# Patient Record
Sex: FEMALE | Race: BLACK OR AFRICAN AMERICAN | Employment: UNEMPLOYED | URBAN - METROPOLITAN AREA
[De-identification: names, ages, dates, MRNs, and addresses within clinical notes are randomized per-mention and may not be internally consistent; named-entity substitution may affect disease eponyms.]

---

## 2017-03-25 ENCOUNTER — HOSPITAL ENCOUNTER (OUTPATIENT)
Age: 32
Discharge: HOME | End: 2017-03-25
Payer: MEDICARE

## 2017-03-25 LAB
ABSOLUTE EOS #: 0.1 K/UL (ref 0–0.4)
ABSOLUTE LYMPH #: 2.5 K/UL (ref 1–4.8)
ABSOLUTE MONO #: 0.4 K/UL (ref 0.2–0.8)
ALBUMIN SERPL-MCNC: 3.6 G/DL (ref 3.5–5.2)
ALBUMIN/GLOBULIN RATIO: ABNORMAL (ref 1–2.5)
ALP BLD-CCNC: 88 U/L (ref 35–104)
ALT SERPL-CCNC: 12 U/L (ref 5–33)
ANION GAP SERPL CALCULATED.3IONS-SCNC: 12 MMOL/L (ref 9–17)
AST SERPL-CCNC: 11 U/L
BASOPHILS # BLD: 1 % (ref 0–2)
BASOPHILS ABSOLUTE: 0 K/UL (ref 0–0.2)
BILIRUB SERPL-MCNC: 0.4 MG/DL (ref 0.3–1.2)
BUN BLDV-MCNC: 13 MG/DL (ref 6–20)
BUN/CREAT BLD: 13 (ref 9–20)
CALCIUM SERPL-MCNC: 8.9 MG/DL (ref 8.6–10.4)
CHLORIDE BLD-SCNC: 97 MMOL/L (ref 98–107)
CHOLESTEROL/HDL RATIO: 3.3
CHOLESTEROL: 176 MG/DL
CO2: 27 MMOL/L (ref 20–31)
CREAT SERPL-MCNC: 1.03 MG/DL (ref 0.5–0.9)
DIFFERENTIAL TYPE: ABNORMAL
EOSINOPHILS RELATIVE PERCENT: 2 % (ref 1–4)
GFR AFRICAN AMERICAN: >60 ML/MIN
GFR NON-AFRICAN AMERICAN: >60 ML/MIN
GFR SERPL CREATININE-BSD FRML MDRD: ABNORMAL ML/MIN/{1.73_M2}
GFR SERPL CREATININE-BSD FRML MDRD: ABNORMAL ML/MIN/{1.73_M2}
GLUCOSE BLD-MCNC: 90 MG/DL (ref 70–99)
HCT VFR BLD CALC: 36.5 % (ref 36–46)
HDLC SERPL-MCNC: 54 MG/DL
HEMOGLOBIN: 12.2 G/DL (ref 12–16)
LDL CHOLESTEROL: 101 MG/DL (ref 0–130)
LYMPHOCYTES # BLD: 35 % (ref 24–44)
MCH RBC QN AUTO: 30.5 PG (ref 26–34)
MCHC RBC AUTO-ENTMCNC: 33.4 G/DL (ref 31–37)
MCV RBC AUTO: 91.3 FL (ref 80–100)
MONOCYTES # BLD: 6 % (ref 1–7)
PDW BLD-RTO: 17.2 % (ref 11.5–14.5)
PLATELET # BLD: 252 K/UL (ref 130–400)
PLATELET ESTIMATE: ABNORMAL
PMV BLD AUTO: 7.6 FL (ref 6–12)
POTASSIUM SERPL-SCNC: 3.3 MMOL/L (ref 3.7–5.3)
RBC # BLD: 3.99 M/UL (ref 4–5.2)
RBC # BLD: ABNORMAL 10*6/UL
SEG NEUTROPHILS: 56 % (ref 36–66)
SEGMENTED NEUTROPHILS ABSOLUTE COUNT: 3.9 K/UL (ref 1.8–7.7)
SODIUM BLD-SCNC: 136 MMOL/L (ref 135–144)
TOTAL PROTEIN: 7.4 G/DL (ref 6.4–8.3)
TRIGL SERPL-MCNC: 106 MG/DL
TSH SERPL DL<=0.05 MIU/L-ACNC: 0.54 MIU/L (ref 0.3–5)
VITAMIN D 25-HYDROXY: <5 NG/ML (ref 30–100)
VLDLC SERPL CALC-MCNC: NORMAL MG/DL (ref 1–30)
WBC # BLD: 7 K/UL (ref 3.5–11)
WBC # BLD: ABNORMAL 10*3/UL

## 2017-03-25 PROCEDURE — 36415 COLL VENOUS BLD VENIPUNCTURE: CPT

## 2017-03-25 PROCEDURE — 85025 COMPLETE CBC W/AUTO DIFF WBC: CPT

## 2017-03-25 PROCEDURE — 84443 ASSAY THYROID STIM HORMONE: CPT

## 2017-03-25 PROCEDURE — 80053 COMPREHEN METABOLIC PANEL: CPT

## 2017-03-25 PROCEDURE — 80061 LIPID PANEL: CPT

## 2017-03-25 PROCEDURE — 82306 VITAMIN D 25 HYDROXY: CPT

## 2018-08-08 ENCOUNTER — HOSPITAL ENCOUNTER (OUTPATIENT)
Age: 33
Discharge: HOME | End: 2018-08-08
Payer: MEDICARE

## 2018-08-08 LAB
ALBUMIN SERPL-MCNC: 3.3 G/DL (ref 3.5–5.2)
ALBUMIN/GLOBULIN RATIO: ABNORMAL (ref 1–2.5)
ALP BLD-CCNC: 103 U/L (ref 35–104)
ALT SERPL-CCNC: 18 U/L (ref 5–33)
ANION GAP SERPL CALCULATED.3IONS-SCNC: 11 MMOL/L (ref 9–17)
AST SERPL-CCNC: 13 U/L
BILIRUB SERPL-MCNC: 0.38 MG/DL (ref 0.3–1.2)
BUN BLDV-MCNC: 7 MG/DL (ref 6–20)
BUN/CREAT BLD: 10 (ref 9–20)
CALCIUM SERPL-MCNC: 8.8 MG/DL (ref 8.6–10.4)
CHLORIDE BLD-SCNC: 101 MMOL/L (ref 98–107)
CO2: 24 MMOL/L (ref 20–31)
CREAT SERPL-MCNC: 0.69 MG/DL (ref 0.5–0.9)
FERRITIN: 40 UG/L (ref 13–150)
FOLATE: >20 NG/ML
GFR AFRICAN AMERICAN: >60 ML/MIN
GFR NON-AFRICAN AMERICAN: >60 ML/MIN
GFR SERPL CREATININE-BSD FRML MDRD: ABNORMAL ML/MIN/{1.73_M2}
GFR SERPL CREATININE-BSD FRML MDRD: ABNORMAL ML/MIN/{1.73_M2}
GLUCOSE BLD-MCNC: 96 MG/DL (ref 70–99)
HBV SURFACE AB TITR SER: <3.5 MIU/ML
HCT VFR BLD CALC: 37.5 % (ref 36–46)
HEMOGLOBIN: 12.3 G/DL (ref 12–16)
MCH RBC QN AUTO: 33 PG (ref 26–34)
MCHC RBC AUTO-ENTMCNC: 32.8 G/DL (ref 31–37)
MCV RBC AUTO: 100.7 FL (ref 80–100)
NRBC AUTOMATED: ABNORMAL PER 100 WBC
PDW BLD-RTO: 15.4 % (ref 11.5–14.5)
PLATELET # BLD: 211 K/UL (ref 130–400)
PMV BLD AUTO: 7.8 FL (ref 6–12)
POTASSIUM SERPL-SCNC: 3.8 MMOL/L (ref 3.7–5.3)
RBC # BLD: 3.73 M/UL (ref 4–5.2)
SODIUM BLD-SCNC: 136 MMOL/L (ref 135–144)
TOTAL PROTEIN: 6.8 G/DL (ref 6.4–8.3)
VITAMIN B-12: 331 PG/ML (ref 232–1245)
VITAMIN D 25-HYDROXY: 25.8 NG/ML (ref 30–100)
WBC # BLD: 7.6 K/UL (ref 3.5–11)

## 2018-08-08 PROCEDURE — 82607 VITAMIN B-12: CPT

## 2018-08-08 PROCEDURE — 86317 IMMUNOASSAY INFECTIOUS AGENT: CPT

## 2018-08-08 PROCEDURE — 82746 ASSAY OF FOLIC ACID SERUM: CPT

## 2018-08-08 PROCEDURE — 36415 COLL VENOUS BLD VENIPUNCTURE: CPT

## 2018-08-08 PROCEDURE — 82306 VITAMIN D 25 HYDROXY: CPT

## 2018-08-08 PROCEDURE — 85027 COMPLETE CBC AUTOMATED: CPT

## 2018-08-08 PROCEDURE — 80053 COMPREHEN METABOLIC PANEL: CPT

## 2018-08-08 PROCEDURE — 82728 ASSAY OF FERRITIN: CPT

## 2021-04-17 ENCOUNTER — HOSPITAL ENCOUNTER (EMERGENCY)
Age: 36
Discharge: HOME | End: 2021-04-17
Attending: EMERGENCY MEDICINE
Payer: MEDICARE

## 2021-04-17 ENCOUNTER — APPOINTMENT (OUTPATIENT)
Dept: GENERAL RADIOLOGY | Age: 36
End: 2021-04-17
Payer: MEDICARE

## 2021-04-17 VITALS
OXYGEN SATURATION: 100 % | RESPIRATION RATE: 18 BRPM | TEMPERATURE: 97.9 F | WEIGHT: 280 LBS | HEART RATE: 68 BPM | DIASTOLIC BLOOD PRESSURE: 93 MMHG | SYSTOLIC BLOOD PRESSURE: 142 MMHG

## 2021-04-17 DIAGNOSIS — R07.89 OTHER CHEST PAIN: ICD-10-CM

## 2021-04-17 DIAGNOSIS — R07.89 CHEST WALL PAIN: Primary | ICD-10-CM

## 2021-04-17 LAB
ABSOLUTE EOS #: 0.1 K/UL (ref 0–0.4)
ABSOLUTE IMMATURE GRANULOCYTE: ABNORMAL K/UL (ref 0–0.3)
ABSOLUTE LYMPH #: 1.8 K/UL (ref 1–4.8)
ABSOLUTE MONO #: 0.4 K/UL (ref 0.1–1.3)
ALBUMIN SERPL-MCNC: 3.5 G/DL (ref 3.5–5.2)
ALBUMIN/GLOBULIN RATIO: ABNORMAL (ref 1–2.5)
ALP BLD-CCNC: 75 U/L (ref 35–104)
ALT SERPL-CCNC: 8 U/L (ref 5–33)
ANION GAP SERPL CALCULATED.3IONS-SCNC: 7 MMOL/L (ref 9–17)
AST SERPL-CCNC: 9 U/L
BASOPHILS # BLD: 1 % (ref 0–2)
BASOPHILS ABSOLUTE: 0 K/UL (ref 0–0.2)
BILIRUB SERPL-MCNC: 0.55 MG/DL (ref 0.3–1.2)
BUN BLDV-MCNC: 10 MG/DL (ref 6–20)
BUN/CREAT BLD: ABNORMAL (ref 9–20)
CALCIUM SERPL-MCNC: 8.5 MG/DL (ref 8.6–10.4)
CHLORIDE BLD-SCNC: 107 MMOL/L (ref 98–107)
CO2: 24 MMOL/L (ref 20–31)
CREAT SERPL-MCNC: 0.87 MG/DL (ref 0.5–0.9)
DIFFERENTIAL TYPE: ABNORMAL
EOSINOPHILS RELATIVE PERCENT: 2 % (ref 0–4)
GFR AFRICAN AMERICAN: >60 ML/MIN
GFR NON-AFRICAN AMERICAN: >60 ML/MIN
GFR SERPL CREATININE-BSD FRML MDRD: ABNORMAL ML/MIN/{1.73_M2}
GFR SERPL CREATININE-BSD FRML MDRD: ABNORMAL ML/MIN/{1.73_M2}
GLUCOSE BLD-MCNC: 90 MG/DL (ref 70–99)
HCG QUALITATIVE: NEGATIVE
HCT VFR BLD CALC: 33.6 % (ref 36–46)
HEMOGLOBIN: 10.9 G/DL (ref 12–16)
IMMATURE GRANULOCYTES: ABNORMAL %
INR BLD: 1
LYMPHOCYTES # BLD: 38 % (ref 24–44)
MAGNESIUM: 1.7 MG/DL (ref 1.6–2.6)
MCH RBC QN AUTO: 31.9 PG (ref 26–34)
MCHC RBC AUTO-ENTMCNC: 32.5 G/DL (ref 31–37)
MCV RBC AUTO: 98.2 FL (ref 80–100)
MONOCYTES # BLD: 9 % (ref 1–7)
NRBC AUTOMATED: ABNORMAL PER 100 WBC
PDW BLD-RTO: 16.4 % (ref 11.5–14.9)
PLATELET # BLD: 236 K/UL (ref 150–450)
PLATELET ESTIMATE: ABNORMAL
PMV BLD AUTO: 8 FL (ref 6–12)
POTASSIUM SERPL-SCNC: 4.6 MMOL/L (ref 3.7–5.3)
PROTHROMBIN TIME: 13.2 SEC (ref 11.8–14.6)
RBC # BLD: 3.42 M/UL (ref 4–5.2)
RBC # BLD: ABNORMAL 10*6/UL
SEG NEUTROPHILS: 50 % (ref 36–66)
SEGMENTED NEUTROPHILS ABSOLUTE COUNT: 2.4 K/UL (ref 1.3–9.1)
SODIUM BLD-SCNC: 138 MMOL/L (ref 135–144)
TOTAL PROTEIN: 6.7 G/DL (ref 6.4–8.3)
TROPONIN INTERP: NORMAL
TROPONIN INTERP: NORMAL
TROPONIN T: NORMAL NG/ML
TROPONIN T: NORMAL NG/ML
TROPONIN, HIGH SENSITIVITY: <6 NG/L (ref 0–14)
TROPONIN, HIGH SENSITIVITY: <6 NG/L (ref 0–14)
WBC # BLD: 4.7 K/UL (ref 3.5–11)
WBC # BLD: ABNORMAL 10*3/UL

## 2021-04-17 PROCEDURE — 36415 COLL VENOUS BLD VENIPUNCTURE: CPT

## 2021-04-17 PROCEDURE — 93005 ELECTROCARDIOGRAM TRACING: CPT | Performed by: EMERGENCY MEDICINE

## 2021-04-17 PROCEDURE — 85025 COMPLETE CBC W/AUTO DIFF WBC: CPT

## 2021-04-17 PROCEDURE — 80053 COMPREHEN METABOLIC PANEL: CPT

## 2021-04-17 PROCEDURE — 85610 PROTHROMBIN TIME: CPT

## 2021-04-17 PROCEDURE — 6370000000 HC RX 637 (ALT 250 FOR IP): Performed by: EMERGENCY MEDICINE

## 2021-04-17 PROCEDURE — 99284 EMERGENCY DEPT VISIT MOD MDM: CPT

## 2021-04-17 PROCEDURE — 83735 ASSAY OF MAGNESIUM: CPT

## 2021-04-17 PROCEDURE — 84703 CHORIONIC GONADOTROPIN ASSAY: CPT

## 2021-04-17 PROCEDURE — 71045 X-RAY EXAM CHEST 1 VIEW: CPT

## 2021-04-17 PROCEDURE — 84484 ASSAY OF TROPONIN QUANT: CPT

## 2021-04-17 RX ORDER — LIDOCAINE 4 G/G
1 PATCH TOPICAL ONCE
Status: DISCONTINUED | OUTPATIENT
Start: 2021-04-17 | End: 2021-04-17 | Stop reason: HOSPADM

## 2021-04-17 RX ORDER — ACETAMINOPHEN 500 MG
1000 TABLET ORAL EVERY 6 HOURS PRN
Qty: 60 TABLET | Refills: 0 | Status: ON HOLD | OUTPATIENT
Start: 2021-04-17 | End: 2022-06-19 | Stop reason: SDUPTHER

## 2021-04-17 RX ORDER — ACETAMINOPHEN 500 MG
1000 TABLET ORAL ONCE
Status: COMPLETED | OUTPATIENT
Start: 2021-04-17 | End: 2021-04-17

## 2021-04-17 RX ORDER — LIDOCAINE 50 MG/G
1 PATCH TOPICAL DAILY
Qty: 10 PATCH | Refills: 0 | Status: SHIPPED | OUTPATIENT
Start: 2021-04-17

## 2021-04-17 RX ADMIN — ACETAMINOPHEN 1000 MG: 500 TABLET, FILM COATED ORAL at 11:29

## 2021-04-17 ASSESSMENT — PAIN DESCRIPTION - DIRECTION: RADIATING_TOWARDS: L ARM

## 2021-04-17 ASSESSMENT — ENCOUNTER SYMPTOMS
BACK PAIN: 0
NAUSEA: 0
ABDOMINAL PAIN: 0
COUGH: 0
VOMITING: 0
SHORTNESS OF BREATH: 0

## 2021-04-17 ASSESSMENT — HEART SCORE: ECG: 0

## 2021-04-17 ASSESSMENT — PAIN DESCRIPTION - LOCATION: LOCATION: CHEST

## 2021-04-17 ASSESSMENT — PAIN DESCRIPTION - PAIN TYPE: TYPE: ACUTE PAIN

## 2021-04-17 NOTE — ED PROVIDER NOTES
EMERGENCY DEPARTMENT ENCOUNTER    Pt Name: Jamie Cannon  MRN: [id number]  [place] 1985  Date of evaluation: 21  CHIEF COMPLAINT       Chief Complaint   Patient presents with    Chest Pain     Pt states midsternal CP since 9am stating constant and progressively got worse. Pt states L arm/shoulder pain and SOB with CP. EMS gave 1 Nitro PTA pt states it helped with pain. Pt states allergy to ASA. HISTORY OF PRESENT ILLNESS     Chest Pain  Pain location:  R chest  Pain quality: aching    Pain radiates to:  R shoulder  Pain severity:  Moderate  Onset quality:  Gradual  Duration:  2 hours  Timing:  Constant  Progression:  Unchanged  Chronicity:  New  Context: lifting, movement and raising an arm    Relieved by:  Rest  Worsened by: Movement and certain positions  Ineffective treatments:  None tried  Associated symptoms: no abdominal pain, no back pain, no cough, no diaphoresis, no dizziness, no fever, no lower extremity edema, no nausea, no near-syncope, no numbness, no palpitations, no shortness of breath, no syncope, no vomiting and no weakness    was lifting a laundry hamper and carrying it upstairs today  Has three kids  No leg pain  No recent surgery or travel        REVIEW OF SYSTEMS     Review of Systems   Constitutional: Negative for diaphoresis and fever. Respiratory: Negative for cough and shortness of breath. Cardiovascular: Positive for chest pain. Negative for palpitations, syncope and near-syncope. Gastrointestinal: Negative for abdominal pain, nausea and vomiting. Musculoskeletal: Negative for back pain. Neurological: Negative for dizziness, weakness and numbness. All other systems reviewed and are negative. PASTMEDICAL HISTORY     Past Medical History:   Diagnosis Date    Hypertension      Past Problem List  There is no problem list on file for this patient.     SURGICAL HISTORY       Past Surgical History:   Procedure Laterality Date     SECTION      x2 CURRENT MEDICATIONS       Previous Medications    No medications on file     ALLERGIES     is allergic to asa [aspirin]; bee pollen; dust mite extract; flour; potassium-containing compounds; shellfish-derived products; and strawberry extract. FAMILY HISTORY     has no family status information on file. SOCIAL HISTORY       Social History     Tobacco Use    Smoking status: Current Every Day Smoker     Packs/day: 0.25     Types: Cigarettes    Smokeless tobacco: Never Used   Substance Use Topics    Alcohol use: Not Currently    Drug use: Yes     Types: Marijuana     PHYSICAL EXAM     INITIAL VITALS: BP (!) 142/93   Pulse 68   Temp 97.9 °F (36.6 °C) (Oral)   Resp 18   Wt 280 lb (127 kg)   LMP 03/24/2021 (Approximate)   SpO2 100%    Physical Exam  Vitals signs reviewed. Constitutional:       General: She is not in acute distress. Appearance: Normal appearance. She is well-developed. She is not diaphoretic. HENT:      Head: Normocephalic and atraumatic. Right Ear: External ear normal.      Left Ear: External ear normal.      Nose: Nose normal. No congestion. Mouth/Throat:      Mouth: Mucous membranes are moist.      Pharynx: Oropharynx is clear. Eyes:      General:         Right eye: No discharge. Left eye: No discharge. Conjunctiva/sclera: Conjunctivae normal.      Pupils: Pupils are equal, round, and reactive to light. Neck:      Musculoskeletal: Normal range of motion and neck supple. Trachea: No tracheal deviation. Cardiovascular:      Rate and Rhythm: Normal rate and regular rhythm. Pulses: Normal pulses. Heart sounds: Normal heart sounds. Pulmonary:      Effort: Pulmonary effort is normal. No respiratory distress. Breath sounds: Normal breath sounds. No stridor. No wheezing or rales. Comments: Right upper ant reproducible chest wall tenderness  Chest:      Chest wall: Tenderness present. Abdominal:      Palpations: Abdomen is soft. Tenderness: There is no abdominal tenderness. There is no guarding or rebound. Musculoskeletal: Normal range of motion. General: No tenderness or deformity. Skin:     General: Skin is warm and dry. Capillary Refill: Capillary refill takes less than 2 seconds. Findings: No erythema or rash. Neurological:      General: No focal deficit present. Mental Status: She is alert and oriented to person, place, and time. Cranial Nerves: No cranial nerve deficit. Coordination: Coordination normal.   Psychiatric:         Mood and Affect: Mood normal.         Behavior: Behavior normal.         Thought Content: Thought content normal.         Judgment: Judgment normal.         MEDICAL DECISION MAKING:   Do not suspect ACS or AMI or PE or AD  Discussed with patient anticipatory guidance, discharge instructions, follow up PCP 24 hours  rx tylenol and lidoderm  Suspect chest wall pain, muscle strain likely  HEART score 1  PERC Rule Negative  Age < 50 years  Pulse < 100 bpm  SaO2 > 94%  No unilateral leg swelling  No hemoptysis  No recent trauma or surgery  No prior PE or DVT  No hormone use           Procedures    DIAGNOSTIC RESULTS   EKG:All EKG's are interpreted by the Emergency Department Physician who either signs or Co-signs this chart in the absence of a cardiologist.  NSR with SA, some artifact, normal rate and intervals      RADIOLOGY:All plain film, CT, MRI, and formal ultrasound images (except ED bedside ultrasound) are read by the radiologist, see reports below, unless otherwisenoted in MDM or here. XR CHEST PORTABLE   Final Result   No acute cardiopulmonary process. LABS: All lab results were reviewed by myself, and all abnormals are listed below.   Labs Reviewed   CBC WITH AUTO DIFFERENTIAL - Abnormal; Notable for the following components:       Result Value    RBC 3.42 (*)     Hemoglobin 10.9 (*)     Hematocrit 33.6 (*)     RDW 16.4 (*)     Monocytes 9 (*)     All other components within normal limits   COMPREHENSIVE METABOLIC PANEL - Abnormal; Notable for the following components:    Calcium 8.5 (*)     Anion Gap 7 (*)     All other components within normal limits   HCG, SERUM, QUALITATIVE   TROPONIN   PROTIME-INR   MAGNESIUM   TROPONIN       EMERGENCY DEPARTMENTCOURSE:         Vitals:    Vitals:    04/17/21 1048 04/17/21 1100   BP: (!) 153/95 (!) 142/93   Pulse: 66 68   Resp: 18    Temp: 97.9 °F (36.6 °C)    TempSrc: Oral    SpO2: 100% 100%   Weight: 280 lb (127 kg)        The patient was given the following medications while in the emergency department:  Orders Placed This Encounter   Medications    acetaminophen (TYLENOL) tablet 1,000 mg    lidocaine 4 % external patch 1 patch    acetaminophen (TYLENOL) 500 MG tablet     Sig: Take 2 tablets by mouth every 6 hours as needed for Pain     Dispense:  60 tablet     Refill:  0    lidocaine (LIDODERM) 5 %     Sig: Place 1 patch onto the skin daily 12 hours on, 12 hours off. Dispense:  10 patch     Refill:  0     CONSULTS:  None    FINAL IMPRESSION      1. Chest wall pain    2. Other chest pain          DISPOSITION/PLAN   DISPOSITION Discharge - Pending Orders Complete 04/17/2021 01:26:30 PM      PATIENT REFERRED TO:  Aurelia Woodward, APRN - CNP  [address] [facility]  [phone]    Schedule an appointment as soon as possible for a visit in 1 day      DISCHARGE MEDICATIONS:  New Prescriptions    ACETAMINOPHEN (TYLENOL) 500 MG TABLET    Take 2 tablets by mouth every 6 hours as needed for Pain    LIDOCAINE (LIDODERM) 5 %    Place 1 patch onto the skin daily 12 hours on, 12 hours off.      Turner Huber MD  Attending Emergency Physician                    Turner Huber MD  04/17/21 9887

## 2021-04-19 LAB
EKG ATRIAL RATE: 61 BPM
EKG P AXIS: 24 DEGREES
EKG P-R INTERVAL: 174 MS
EKG Q-T INTERVAL: 392 MS
EKG QRS DURATION: 70 MS
EKG QTC CALCULATION (BAZETT): 394 MS
EKG R AXIS: 32 DEGREES
EKG T AXIS: 47 DEGREES
EKG VENTRICULAR RATE: 61 BPM

## 2022-04-18 ENCOUNTER — HOSPITAL ENCOUNTER (EMERGENCY)
Age: 37
Discharge: HOME | End: 2022-04-18
Attending: EMERGENCY MEDICINE
Payer: MEDICARE

## 2022-04-18 VITALS
HEART RATE: 85 BPM | TEMPERATURE: 98.1 F | WEIGHT: 265 LBS | RESPIRATION RATE: 18 BRPM | SYSTOLIC BLOOD PRESSURE: 120 MMHG | BODY MASS INDEX: 42.59 KG/M2 | HEIGHT: 66 IN | OXYGEN SATURATION: 98 % | DIASTOLIC BLOOD PRESSURE: 74 MMHG

## 2022-04-18 DIAGNOSIS — O99.891 ASYMPTOMATIC BACTERIURIA DURING PREGNANCY: Primary | ICD-10-CM

## 2022-04-18 DIAGNOSIS — R82.71 ASYMPTOMATIC BACTERIURIA DURING PREGNANCY: Primary | ICD-10-CM

## 2022-04-18 LAB
-: ABNORMAL
ABSOLUTE EOS #: 0.23 K/UL (ref 0–0.44)
ABSOLUTE IMMATURE GRANULOCYTE: 0.07 K/UL (ref 0–0.3)
ABSOLUTE LYMPH #: 2.57 K/UL (ref 1.1–3.7)
ABSOLUTE MONO #: 0.8 K/UL (ref 0.1–1.2)
ANION GAP SERPL CALCULATED.3IONS-SCNC: 8 MMOL/L (ref 9–17)
BACTERIA: ABNORMAL
BASOPHILS # BLD: 0 % (ref 0–2)
BASOPHILS ABSOLUTE: <0.03 K/UL (ref 0–0.2)
BILIRUBIN URINE: ABNORMAL
BUN BLDV-MCNC: 9 MG/DL (ref 6–20)
CALCIUM SERPL-MCNC: 8.9 MG/DL (ref 8.6–10.4)
CASTS UA: ABNORMAL /LPF (ref 0–2)
CASTS UA: ABNORMAL /LPF (ref 0–2)
CHLORIDE BLD-SCNC: 104 MMOL/L (ref 98–107)
CO2: 24 MMOL/L (ref 20–31)
COLOR: ABNORMAL
CREAT SERPL-MCNC: 0.79 MG/DL (ref 0.5–0.9)
EOSINOPHILS RELATIVE PERCENT: 2 % (ref 1–4)
EPITHELIAL CELLS UA: ABNORMAL /HPF (ref 0–5)
GFR AFRICAN AMERICAN: >60 ML/MIN
GFR NON-AFRICAN AMERICAN: >60 ML/MIN
GFR SERPL CREATININE-BSD FRML MDRD: ABNORMAL ML/MIN/{1.73_M2}
GLUCOSE BLD-MCNC: 100 MG/DL (ref 70–99)
GLUCOSE URINE: NEGATIVE
HCT VFR BLD CALC: 32.6 % (ref 36.3–47.1)
HEMOGLOBIN: 10.6 G/DL (ref 11.9–15.1)
IMMATURE GRANULOCYTES: 1 %
KETONES, URINE: ABNORMAL
LEUKOCYTE ESTERASE, URINE: ABNORMAL
LYMPHOCYTES # BLD: 25 % (ref 24–43)
MCH RBC QN AUTO: 31.6 PG (ref 25.2–33.5)
MCHC RBC AUTO-ENTMCNC: 32.5 G/DL (ref 28.4–34.8)
MCV RBC AUTO: 97.3 FL (ref 82.6–102.9)
MONOCYTES # BLD: 8 % (ref 3–12)
MUCUS: ABNORMAL
NITRITE, URINE: NEGATIVE
NRBC AUTOMATED: 0 PER 100 WBC
PDW BLD-RTO: 13.7 % (ref 11.8–14.4)
PH UA: 5 (ref 5–8)
PLATELET # BLD: 287 K/UL (ref 138–453)
PMV BLD AUTO: 9.6 FL (ref 8.1–13.5)
POTASSIUM SERPL-SCNC: 4.2 MMOL/L (ref 3.7–5.3)
PROTEIN UA: ABNORMAL
RBC # BLD: 3.35 M/UL (ref 3.95–5.11)
RBC UA: ABNORMAL /HPF (ref 0–2)
SEG NEUTROPHILS: 64 % (ref 36–65)
SEGMENTED NEUTROPHILS ABSOLUTE COUNT: 6.77 K/UL (ref 1.5–8.1)
SODIUM BLD-SCNC: 136 MMOL/L (ref 135–144)
SPECIFIC GRAVITY UA: 1.03 (ref 1–1.03)
TROPONIN, HIGH SENSITIVITY: <6 NG/L (ref 0–14)
TURBIDITY: ABNORMAL
URINE HGB: ABNORMAL
UROBILINOGEN, URINE: NORMAL
WBC # BLD: 10.5 K/UL (ref 3.5–11.3)
WBC UA: ABNORMAL /HPF (ref 0–5)

## 2022-04-18 PROCEDURE — 93005 ELECTROCARDIOGRAM TRACING: CPT | Performed by: STUDENT IN AN ORGANIZED HEALTH CARE EDUCATION/TRAINING PROGRAM

## 2022-04-18 PROCEDURE — 80048 BASIC METABOLIC PNL TOTAL CA: CPT

## 2022-04-18 PROCEDURE — 85025 COMPLETE CBC W/AUTO DIFF WBC: CPT

## 2022-04-18 PROCEDURE — 84484 ASSAY OF TROPONIN QUANT: CPT

## 2022-04-18 PROCEDURE — 81001 URINALYSIS AUTO W/SCOPE: CPT

## 2022-04-18 PROCEDURE — 99284 EMERGENCY DEPT VISIT MOD MDM: CPT

## 2022-04-18 PROCEDURE — 87086 URINE CULTURE/COLONY COUNT: CPT

## 2022-04-18 RX ORDER — CEPHALEXIN 500 MG/1
500 CAPSULE ORAL 2 TIMES DAILY
Qty: 10 CAPSULE | Refills: 0 | Status: SHIPPED | OUTPATIENT
Start: 2022-04-18 | End: 2022-04-23

## 2022-04-18 ASSESSMENT — ENCOUNTER SYMPTOMS
SINUS PRESSURE: 0
SINUS PAIN: 0
ABDOMINAL DISTENTION: 0
SHORTNESS OF BREATH: 0
DIARRHEA: 0
EYE PAIN: 0
COUGH: 0
EYE ITCHING: 0
ABDOMINAL PAIN: 0
SORE THROAT: 0
CONSTIPATION: 0
NAUSEA: 0

## 2022-04-18 ASSESSMENT — PAIN SCALES - GENERAL: PAINLEVEL_OUTOF10: 6

## 2022-04-19 LAB
CULTURE: NORMAL
EKG ATRIAL RATE: 76 BPM
EKG P AXIS: 12 DEGREES
EKG P-R INTERVAL: 146 MS
EKG Q-T INTERVAL: 346 MS
EKG QRS DURATION: 62 MS
EKG QTC CALCULATION (BAZETT): 389 MS
EKG R AXIS: 50 DEGREES
EKG T AXIS: 49 DEGREES
EKG VENTRICULAR RATE: 76 BPM
SPECIMEN DESCRIPTION: NORMAL

## 2022-04-19 PROCEDURE — 93010 ELECTROCARDIOGRAM REPORT: CPT | Performed by: INTERNAL MEDICINE

## 2022-04-19 NOTE — ED PROVIDER NOTES
[facility]     Emergency Department     Faculty Note/ Attestation      Pt Name: Joey Ritchie                                       MRN: [id number]  [place] 1985  Date of evaluation: 4/18/2022    Patients PCP:    GROVER Thomas - CNP    Attestation  I performed a history and physical examination of the patient and discussed management with the resident. I reviewed the residents note and agree with the documented findings and plan of care. Any areas of disagreement are noted on the chart. I was personally present for the key portions of any procedures. I have documented in the chart those procedures where I was not present during the key portions. I have reviewed the emergency nurses triage note. I agree with the chief complaint, past medical history, past surgical history, allergies, medications, social and family history as documented unless otherwise noted below. For Physician Assistant/ Nurse Practitioner cases/documentation I have personally evaluated this patient and have completed at least one if not all key elements of the E/M (history, physical exam, and MDM). Additional findings are as noted. Initial Screens:        [place] Coma Scale  Eye Opening: Spontaneous  Best Verbal Response: Oriented  Best Motor Response: Obeys commands  [place] Coma Scale Score: 15    Vitals:    Vitals:    04/18/22 2055   BP: 124/75   Pulse: 85   Resp: 18   Temp: 98.1 °F (36.7 °C)   SpO2: 100%   Weight: 265 lb (120.2 kg)   Height: 5' 6\" (1.676 m)       CHIEF COMPLAINT     No chief complaint on file. Patient is a 68-year-old female high risk pregnancy who has not been feeling well.           EMERGENCY DEPARTMENT COURSE:     -------------------------  BP: 124/75, Temp: 98.1 °F (36.7 °C), Pulse: 85, Resp: 18  By the time my examination patient's pain is improved    Comments  Labs showed no signs of kidney damage no signs of electrolyte disturbance no troponin elevation with symptoms over several hours will discuss with OB    ED Course as of 04/18/22 2238 Mon Apr 18, 2022 2130 EKG Interpretation   Interpreted by Jere Barajas DO    Rhythm: normal sinus   Rate: normal  Axis: normal  Ectopy: none  Conduction: normal  ST Segments: normal  T Waves: normal  Q Waves: none    Clinical Impression: no acute changes normal EKG     [WK]   2222 Leukocyte Esterase, Urine(!): MODERATE [LR]   2222 Troponin, High Sensitivity: <6 [LR]      ED Course User Index  [LR] Odessa Beavers DO  [WK] DO Jere Souza DO,, DO, RDMS.   Attending Emergency Physician          Jere Barajas DO  04/18/22 2239

## 2022-04-19 NOTE — ED NOTES
Pt presents to the ED by EMS with c/o dizziness and lt sided abd pain. Pt states that she is currently 18-19 weeks pregnant with her third child, and that she has a history of preeclampsia. VSS. No distress noted. Call light within reach.       Catie Mary RN  04/18/22 7589

## 2022-04-19 NOTE — ED PROVIDER NOTES
[facility]  ED  Emergency Department Encounter  EmergencyMedicine Resident     Pt Suad Smith  MRN: [id number]  [place] 1985  Date of evaluation: 4/18/22  PCP:  GROVER Vazquez CNP    This patient was evaluated in the Emergency Department for symptoms described in the history of present illness. The patient was evaluated in the context of the global COVID-19 pandemic, which necessitated consideration that the patient might be at risk for infection with the SARS-CoV-2 virus that causes COVID-19. Institutional protocols and algorithms that pertain to the evaluation of patients at risk for COVID-19 are in a state of rapid change based on information released by regulatory bodies including the CDC and federal and state organizations. These policies and algorithms were followed during the patient's care in the ED. CHIEF COMPLAINT       No chief complaint on file. HISTORY OF PRESENT ILLNESS  (Location/Symptom, Timing/Onset, Context/Setting, Quality, Duration, Modifying Factors, Severity.)      Cindi Celestin is a 42 y.o. female who presents with episode of dizziness and lightheadedness that was 20 minutes in duration. She denies any chest pain or associated shortness of breath. Symptoms are currently resolved. Reports that she gets episodes like this frequently but they are shorter in duration and usually associated with head movement. This 1 episode was not resolving so she called EMS. She is currently 19 weeks pregnant, denies any vaginal bleeding, loss of fluid, contractions or abdominal pain. She reports a history of preeclampsia and her last pregnancy ended in a 3-month premature delivery. This pregnancy so far has been uncomplicated. Medical history otherwise includes heart murmur which she states has required no intervention. PAST MEDICAL / SURGICAL / SOCIAL / FAMILY HISTORY      has a past medical history of Hypertension.        has a past surgical history that includes  section. Social History     Socioeconomic History    Marital status: Single     Spouse name: Not on file    Number of children: Not on file    Years of education: Not on file    Highest education level: Not on file   Occupational History    Not on file   Tobacco Use    Smoking status: Current Every Day Smoker     Packs/day: 0.25     Types: Cigarettes    Smokeless tobacco: Never Used   Substance and Sexual Activity    Alcohol use: Not Currently    Drug use: Yes     Types: Marijuana Gely Dasen)    Sexual activity: Not on file   Other Topics Concern    Not on file   Social History Narrative    Not on file     Social Determinants of Health     Financial Resource Strain:     Difficulty of Paying Living Expenses: Not on file   Food Insecurity:     Worried About Running Out of Food in the Last Year: Not on file    Justina of Food in the Last Year: Not on file   Transportation Needs:     Lack of Transportation (Medical): Not on file    Lack of Transportation (Non-Medical):  Not on file   Physical Activity:     Days of Exercise per Week: Not on file    Minutes of Exercise per Session: Not on file   Stress:     Feeling of Stress : Not on file   Social Connections:     Frequency of Communication with Friends and Family: Not on file    Frequency of Social Gatherings with Friends and Family: Not on file    Attends Catholic Services: Not on file    Active Member of [address] [place] [address] [email or web address] or Organizations: Not on file    Attends Club or Organization Meetings: Not on file    Marital Status: Not on file   Intimate Partner Violence:     Fear of Current or Ex-Partner: Not on file    Emotionally Abused: Not on file    Physically Abused: Not on file    Sexually Abused: Not on file   Housing Stability:     Unable to Pay for Housing in the Last Year: Not on file    Number of [place] in the Last Year: Not on file    Unstable Housing in the Last Year: Not on file       No family history on file.    Allergies:  Asa [aspirin], Bee pollen, Dust mite extract, Flour, Potassium-containing compounds, Shellfish-derived products, and Strawberry extract    Home Medications:  Prior to Admission medications    Medication Sig Start Date End Date Taking? Authorizing Provider   cephALEXin (KEFLEX) 500 MG capsule Take 1 capsule by mouth 2 times daily for 5 days 4/18/22 4/23/22 Yes Odessa Beavers,    acetaminophen (TYLENOL) 500 MG tablet Take 2 tablets by mouth every 6 hours as needed for Pain 4/17/21   Georga Dakins, MD   lidocaine (LIDODERM) 5 % Place 1 patch onto the skin daily 12 hours on, 12 hours off. 4/17/21   Georga Dakins, MD       REVIEW OF SYSTEMS    (2-9 systems for level 4, 10 or more for level 5)      Review of Systems   Constitutional: Negative for activity change, chills and fever. HENT: Negative for congestion, sinus pressure, sinus pain and sore throat. Eyes: Negative for pain and itching. Respiratory: Negative for cough and shortness of breath. Cardiovascular: Negative for chest pain. Gastrointestinal: Negative for abdominal distention, abdominal pain, constipation, diarrhea and nausea. Endocrine: Negative for polyuria. Genitourinary: Negative for dysuria and frequency. Musculoskeletal: Negative for arthralgias. Skin: Negative for rash. Neurological: Positive for light-headedness. Negative for headaches. PHYSICAL EXAM   (up to 7 for level 4, 8 or more for level 5)      INITIAL VITALS:   /74   Pulse 85   Temp 98.1 °F (36.7 °C)   Resp 18   Ht 5' 6\" (1.676 m)   Wt 265 lb (120.2 kg)   SpO2 98%   BMI 42.77 kg/m²     Physical Exam  Vitals reviewed. Constitutional:       General: She is not in acute distress. HENT:      Head: Normocephalic and atraumatic. Ears:      Comments: Hearing grossly normal     Nose: Nose normal.      Mouth/Throat:      Mouth: Mucous membranes are moist.      Pharynx: Oropharynx is clear.    Eyes:      General: No scleral icterus. Conjunctiva/sclera: Conjunctivae normal.      Pupils: Pupils are equal, round, and reactive to light. Cardiovascular:      Rate and Rhythm: Normal rate and regular rhythm. Pulses: Normal pulses. Pulmonary:      Effort: Pulmonary effort is normal. No respiratory distress. Breath sounds: Normal breath sounds. Abdominal:      General: There is no distension. Palpations: Abdomen is soft. Tenderness: There is no abdominal tenderness. There is no guarding. Comments: Bedside US shows fetal heart tones 154   Musculoskeletal:      Cervical back: No muscular tenderness. Right lower leg: No edema. Left lower leg: No edema. Skin:     General: Skin is warm and dry. Capillary Refill: Capillary refill takes less than 2 seconds. Neurological:      General: No focal deficit present. Mental Status: She is alert and oriented to person, place, and time. Mental status is at baseline. Comments: 5/5 strength throughout  Symmetrical eyebrow raise and smile.          DIFFERENTIAL  DIAGNOSIS     PLAN (LABS / IMAGING / EKG):  Orders Placed This Encounter   Procedures    Culture, Urine    Basic Metabolic Panel    CBC with Auto Differential    Troponin    Urinalysis with Reflex to Culture    Microscopic Urinalysis    EKG 12 Lead    Insert peripheral IV       MEDICATIONS ORDERED:  Orders Placed This Encounter   Medications    cephALEXin (KEFLEX) 500 MG capsule     Sig: Take 1 capsule by mouth 2 times daily for 5 days     Dispense:  10 capsule     Refill:  0       DIAGNOSTIC RESULTS / EMERGENCY DEPARTMENT COURSE / MDM   LAB RESULTS:  Results for orders placed or performed during the hospital encounter of 83/72/58   Basic Metabolic Panel   Result Value Ref Range    Glucose 100 (H) 70 - 99 mg/dL    BUN 9 6 - 20 mg/dL    CREATININE 0.79 0.50 - 0.90 mg/dL    Calcium 8.9 8.6 - 10.4 mg/dL    Sodium 136 135 - 144 mmol/L    Potassium 4.2 3.7 - 5.3 mmol/L    Chloride 104 98 - 107 mmol/L    CO2 24 20 - 31 mmol/L    Anion Gap 8 (L) 9 - 17 mmol/L    GFR Non-African American >60 >60 mL/min    GFR African American >60 >60 mL/min    GFR Comment         CBC with Auto Differential   Result Value Ref Range    WBC 10.5 3.5 - 11.3 k/uL    RBC 3.35 (L) 3.95 - 5.11 m/uL    Hemoglobin 10.6 (L) 11.9 - 15.1 g/dL    Hematocrit 32.6 (L) 36.3 - 47.1 %    MCV 97.3 82.6 - 102.9 fL    MCH 31.6 25.2 - 33.5 pg    MCHC 32.5 28.4 - 34.8 g/dL    RDW 13.7 11.8 - 14.4 %    Platelets 872 131 - 361 k/uL    MPV 9.6 8.1 - 13.5 fL    NRBC Automated 0.0 0.0 per 100 WBC    Seg Neutrophils 64 36 - 65 %    Lymphocytes 25 24 - 43 %    Monocytes 8 3 - 12 %    Eosinophils % 2 1 - 4 %    Basophils 0 0 - 2 %    Immature Granulocytes 1 (H) 0 %    Segs Absolute 6.77 1.50 - 8.10 k/uL    Absolute Lymph # 2.57 1.10 - 3.70 k/uL    Absolute Mono # 0.80 0.10 - 1.20 k/uL    Absolute Eos # 0.23 0.00 - 0.44 k/uL    Basophils Absolute <0.03 0.00 - 0.20 k/uL    Absolute Immature Granulocyte 0.07 0.00 - 0.30 k/uL   Troponin   Result Value Ref Range    Troponin, High Sensitivity <6 0 - 14 ng/L   Urinalysis with Reflex to Culture    Specimen: Urine   Result Value Ref Range    Color, UA Dark Yellow (A) Yellow    Turbidity UA Cloudy (A) Clear    Glucose, Ur NEGATIVE NEGATIVE    Bilirubin Urine NEGATIVE  Verified by ictotest. (A) NEGATIVE    Ketones, Urine TRACE (A) NEGATIVE    Specific Gravity, UA 1.029 1.005 - 1.030    Urine Hgb TRACE (A) NEGATIVE    pH, UA 5.0 5.0 - 8.0    Protein, UA 1+ (A) NEGATIVE    Urobilinogen, Urine Normal Normal    Nitrite, Urine NEGATIVE NEGATIVE    Leukocyte Esterase, Urine MODERATE (A) NEGATIVE   Microscopic Urinalysis   Result Value Ref Range    -          WBC, UA TOO NUMEROUS TO COUNT 0 - 5 /HPF    RBC, UA 10 TO 20 0 - 2 /HPF    Casts UA 10 TO 20 0 - 2 /LPF    Casts UA HYALINE 0 - 2 /LPF    Epithelial Cells UA 5 TO 10 0 - 5 /HPF    Bacteria, UA MODERATE (A) None    Mucus, UA 2+ (A) None       IMPRESSION: Albaro Lara Terence Rowland is a 42 y.o. woman presenting with lightheadedness and currently 19 weeks pregnant with a high risk pregnancy and history of preeclampsia. Pressures are normal on presentation. Symptoms are fully resolved at this time. She does have a history of lightheaded episodes which have previously been associated with head movements, possibly related to BPPV. Her neuro exam is nonfocal.  She reports that she is well-hydrated. Will obtain cardiac work-up and urinalysis and assess fetal heart tones with bedside ultrasound. If work-up is negative will contact OB to inform prior to discharge due to high risk pregnancy. RADIOLOGY:  No results found. EKG  Normal rate, sinus, normal axis, normal intervals, no ST segment elevations or depressions    All EKG's are interpreted by the Emergency Department Physician who either signs or Co-signs this chart in the absence of a cardiologist.    EMERGENCY DEPARTMENT COURSE:  Patient seen and evaluated, VSS and nontoxic in appearance. ED work-up demonstrates undetectable troponin, unremarkable blood counts and kidney function and electrolytes. EKG was nonconcerning. Bedside ultrasound shows fetal heart tones of 154. Urine does show signs of infection, patient is asymptomatic. Will treat asymptomatic bacteriuria in pregnancy. Advised patient to follow-up with OB.  OB/GYN was contacted and did not advise any further work-up for high risk pregnancy. Patient was recommended for discharge. Patient understands to return to the emergency department for any new or worsening symptoms and to see their PCP regarding hospital follow up.    ED Course as of 04/19/22 0150   Mon Apr 18, 2022 2130 EKG Interpretation   Interpreted by Rica Keyes DO    Rhythm: normal sinus   Rate: normal  Axis: normal  Ectopy: none  Conduction: normal  ST Segments: normal  T Waves: normal  Q Waves: none    Clinical Impression: no acute changes normal EKG     [WK]   2222 Leukocyte Esterase, Urine(!): MODERATE [LR]   2222 Troponin, High Sensitivity: <6 [LR]      ED Course User Index  [LR] Odessa Beavers DO  [WK] Yoon Ulloa DO      PROCEDURES:  none    CONSULTS:  None    CRITICAL CARE:  See attending note    FINAL IMPRESSION      1.  Asymptomatic bacteriuria during pregnancy          DISPOSITION / PLAN     DISPOSITION  Discharged 04/19/22 1:51 AM     PATIENT REFERRED TO:  Wyatt Gonzalez, APRN - CNP  [address] [place] [facility]  [phone]      As needed, If symptoms worsen    [facility]  [address] [place] [address]  [phone]    As needed, If symptoms worsen      DISCHARGE MEDICATIONS:  Discharge Medication List as of 4/18/2022 10:52 PM      START taking these medications    Details   cephALEXin (KEFLEX) 500 MG capsule Take 1 capsule by mouth 2 times daily for 5 days, Disp-10 capsule, R-0Print             Benigno Smith DO  Emergency Medicine Resident    (Please note that portions of thisnote were completed with a voice recognition program.  Efforts were made to edit the dictations but occasionally words are mis-transcribed.)       Benigno Smith DO  Resident  04/19/22 0151

## 2022-04-19 NOTE — ED NOTES
Pt provided ice chips and a warm blanket. Lights dimmed for comfort.      Gerson Johnson, CHASTITY  04/18/22 5365

## 2022-06-18 ENCOUNTER — ANESTHESIA EVENT (OUTPATIENT)
Dept: LABOR AND DELIVERY | Age: 37
DRG: 540 | End: 2022-06-18
Payer: MEDICARE

## 2022-06-18 ENCOUNTER — APPOINTMENT (OUTPATIENT)
Dept: CT IMAGING | Age: 37
DRG: 540 | End: 2022-06-18
Payer: MEDICARE

## 2022-06-18 ENCOUNTER — HOSPITAL ENCOUNTER (INPATIENT)
Age: 37
LOS: 4 days | Discharge: HOME | DRG: 540 | End: 2022-06-22
Attending: OBSTETRICS & GYNECOLOGY | Admitting: OBSTETRICS & GYNECOLOGY
Payer: MEDICARE

## 2022-06-18 ENCOUNTER — ANESTHESIA (OUTPATIENT)
Dept: LABOR AND DELIVERY | Age: 37
DRG: 540 | End: 2022-06-18
Payer: MEDICARE

## 2022-06-18 DIAGNOSIS — E04.1 THYROID NODULE: ICD-10-CM

## 2022-06-18 DIAGNOSIS — Z98.890 POSTOPERATIVE STATE: Primary | ICD-10-CM

## 2022-06-18 PROBLEM — Z98.891 HISTORY OF C-SECTION: Status: ACTIVE | Noted: 2022-06-18

## 2022-06-18 PROBLEM — Z87.59 HISTORY OF PRE-ECLAMPSIA: Status: ACTIVE | Noted: 2022-06-18

## 2022-06-18 PROBLEM — O99.012 ANEMIA AFFECTING PREGNANCY IN SECOND TRIMESTER: Status: ACTIVE | Noted: 2022-06-18

## 2022-06-18 PROBLEM — Z87.898 HISTORY OF POOR FETAL GROWTH: Status: ACTIVE | Noted: 2022-06-18

## 2022-06-18 PROBLEM — O09.30 LATE PRENATAL CARE: Status: ACTIVE | Noted: 2022-06-18

## 2022-06-18 PROBLEM — O10.919 CHRONIC HYPERTENSION AFFECTING PREGNANCY: Status: ACTIVE | Noted: 2022-06-18

## 2022-06-18 PROBLEM — Z72.0 TOBACCO USE: Status: ACTIVE | Noted: 2022-06-18

## 2022-06-18 PROBLEM — O09.529 AMA (ADVANCED MATERNAL AGE) MULTIGRAVIDA 35+: Status: ACTIVE | Noted: 2022-06-18

## 2022-06-18 PROBLEM — I10 CHRONIC HYPERTENSION: Status: ACTIVE | Noted: 2022-06-18

## 2022-06-18 PROBLEM — F12.10 TETRAHYDROCANNABINOL (THC) USE DISORDER, MILD, ABUSE: Status: ACTIVE | Noted: 2022-06-18

## 2022-06-18 PROBLEM — B97.7 HPV IN FEMALE: Status: ACTIVE | Noted: 2022-06-18

## 2022-06-18 PROBLEM — Z87.51 HISTORY OF PRETERM DELIVERY: Status: ACTIVE | Noted: 2022-06-18

## 2022-06-18 LAB
ABO/RH: NORMAL
ABSOLUTE EOS #: 0.09 K/UL (ref 0–0.44)
ABSOLUTE EOS #: <0.03 K/UL (ref 0–0.44)
ABSOLUTE IMMATURE GRANULOCYTE: 0.16 K/UL (ref 0–0.3)
ABSOLUTE IMMATURE GRANULOCYTE: <0.03 K/UL (ref 0–0.3)
ABSOLUTE LYMPH #: 1.02 K/UL (ref 1.1–3.7)
ABSOLUTE LYMPH #: 2.2 K/UL (ref 1.1–3.7)
ABSOLUTE MONO #: 0.31 K/UL (ref 0.1–1.2)
ABSOLUTE MONO #: 0.53 K/UL (ref 0.1–1.2)
ALBUMIN SERPL-MCNC: 3.1 G/DL (ref 3.5–5.2)
ALBUMIN SERPL-MCNC: 3.5 G/DL (ref 3.5–5.2)
ALBUMIN/GLOBULIN RATIO: 0.8 (ref 1–2.5)
ALBUMIN/GLOBULIN RATIO: 0.9 (ref 1–2.5)
ALP BLD-CCNC: 113 U/L (ref 35–104)
ALP BLD-CCNC: 117 U/L (ref 35–104)
ALT SERPL-CCNC: 43 U/L (ref 5–33)
ALT SERPL-CCNC: 46 U/L (ref 5–33)
AMPHETAMINE SCREEN URINE: NEGATIVE
ANION GAP SERPL CALCULATED.3IONS-SCNC: 13 MMOL/L (ref 9–17)
ANION GAP SERPL CALCULATED.3IONS-SCNC: 16 MMOL/L (ref 9–17)
ANTIBODY SCREEN: NEGATIVE
ARM BAND NUMBER: NORMAL
AST SERPL-CCNC: 29 U/L
AST SERPL-CCNC: 35 U/L
BARBITURATE SCREEN URINE: NEGATIVE
BASOPHILS # BLD: 0 % (ref 0–2)
BASOPHILS # BLD: 0 % (ref 0–2)
BASOPHILS ABSOLUTE: 0.03 K/UL (ref 0–0.2)
BASOPHILS ABSOLUTE: <0.03 K/UL (ref 0–0.2)
BENZODIAZEPINE SCREEN, URINE: NEGATIVE
BILIRUB SERPL-MCNC: 0.78 MG/DL (ref 0.3–1.2)
BILIRUB SERPL-MCNC: 0.89 MG/DL (ref 0.3–1.2)
BUN BLDV-MCNC: 11 MG/DL (ref 6–20)
BUN BLDV-MCNC: 11 MG/DL (ref 6–20)
CALCIUM SERPL-MCNC: 8.6 MG/DL (ref 8.6–10.4)
CALCIUM SERPL-MCNC: 8.9 MG/DL (ref 8.6–10.4)
CANNABINOID SCREEN URINE: POSITIVE
CHLORIDE BLD-SCNC: 102 MMOL/L (ref 98–107)
CHLORIDE BLD-SCNC: 102 MMOL/L (ref 98–107)
CO2: 15 MMOL/L (ref 20–31)
CO2: 20 MMOL/L (ref 20–31)
COCAINE METABOLITE, URINE: NEGATIVE
CREAT SERPL-MCNC: 1 MG/DL (ref 0.5–0.9)
CREAT SERPL-MCNC: 1.15 MG/DL (ref 0.5–0.9)
CREATININE URINE: 218.5 MG/DL (ref 28–217)
EOSINOPHILS RELATIVE PERCENT: 0 % (ref 1–4)
EOSINOPHILS RELATIVE PERCENT: 1 % (ref 1–4)
EXPIRATION DATE: NORMAL
GFR AFRICAN AMERICAN: >60 ML/MIN
GFR AFRICAN AMERICAN: >60 ML/MIN
GFR NON-AFRICAN AMERICAN: 53 ML/MIN
GFR NON-AFRICAN AMERICAN: >60 ML/MIN
GFR SERPL CREATININE-BSD FRML MDRD: ABNORMAL ML/MIN/{1.73_M2}
GFR SERPL CREATININE-BSD FRML MDRD: ABNORMAL ML/MIN/{1.73_M2}
GLUCOSE BLD-MCNC: 74 MG/DL (ref 70–99)
GLUCOSE BLD-MCNC: 84 MG/DL (ref 70–99)
HCT VFR BLD CALC: 36.5 % (ref 36.3–47.1)
HCT VFR BLD CALC: 38.2 % (ref 36.3–47.1)
HEMOGLOBIN: 12.1 G/DL (ref 11.9–15.1)
HEMOGLOBIN: 12.4 G/DL (ref 11.9–15.1)
IMMATURE GRANULOCYTES: 0 %
IMMATURE GRANULOCYTES: 2 %
LV EF: 58 %
LVEF MODALITY: NORMAL
LYMPHOCYTES # BLD: 10 % (ref 24–43)
LYMPHOCYTES # BLD: 35 % (ref 24–43)
MAGNESIUM: 2.8 MG/DL (ref 1.6–2.6)
MCH RBC QN AUTO: 31.8 PG (ref 25.2–33.5)
MCH RBC QN AUTO: 32 PG (ref 25.2–33.5)
MCHC RBC AUTO-ENTMCNC: 32.5 G/DL (ref 28.4–34.8)
MCHC RBC AUTO-ENTMCNC: 33.2 G/DL (ref 28.4–34.8)
MCV RBC AUTO: 96.1 FL (ref 82.6–102.9)
MCV RBC AUTO: 98.5 FL (ref 82.6–102.9)
METHADONE SCREEN, URINE: NEGATIVE
MONOCYTES # BLD: 3 % (ref 3–12)
MONOCYTES # BLD: 8 % (ref 3–12)
NRBC AUTOMATED: 0 PER 100 WBC
NRBC AUTOMATED: 0 PER 100 WBC
OPIATES, URINE: NEGATIVE
OXYCODONE SCREEN URINE: NEGATIVE
PDW BLD-RTO: 14.6 % (ref 11.8–14.4)
PDW BLD-RTO: 14.8 % (ref 11.8–14.4)
PHENCYCLIDINE, URINE: NEGATIVE
PLATELET # BLD: 250 K/UL (ref 138–453)
PLATELET # BLD: 268 K/UL (ref 138–453)
PMV BLD AUTO: 10.3 FL (ref 8.1–13.5)
PMV BLD AUTO: 10.3 FL (ref 8.1–13.5)
POTASSIUM SERPL-SCNC: 3.7 MMOL/L (ref 3.7–5.3)
POTASSIUM SERPL-SCNC: 3.8 MMOL/L (ref 3.7–5.3)
PRO-BNP: 1578 PG/ML
RBC # BLD: 3.8 M/UL (ref 3.95–5.11)
RBC # BLD: 3.88 M/UL (ref 3.95–5.11)
RBC # BLD: ABNORMAL 10*6/UL
RBC # BLD: ABNORMAL 10*6/UL
SARS-COV-2, RAPID: NOT DETECTED
SEG NEUTROPHILS: 56 % (ref 36–65)
SEG NEUTROPHILS: 85 % (ref 36–65)
SEGMENTED NEUTROPHILS ABSOLUTE COUNT: 3.51 K/UL (ref 1.5–8.1)
SEGMENTED NEUTROPHILS ABSOLUTE COUNT: 8.76 K/UL (ref 1.5–8.1)
SODIUM BLD-SCNC: 133 MMOL/L (ref 135–144)
SODIUM BLD-SCNC: 135 MMOL/L (ref 135–144)
SPECIMEN DESCRIPTION: NORMAL
T. PALLIDUM, IGG: NONREACTIVE
TEST INFORMATION: ABNORMAL
THYROXINE, FREE: 1.05 NG/DL (ref 0.93–1.7)
TOTAL PROTEIN, URINE: 8640 MG/DL
TOTAL PROTEIN: 7 G/DL (ref 6.4–8.3)
TOTAL PROTEIN: 7.3 G/DL (ref 6.4–8.3)
TROPONIN, HIGH SENSITIVITY: 18 NG/L (ref 0–14)
TROPONIN, HIGH SENSITIVITY: 20 NG/L (ref 0–14)
TSH SERPL DL<=0.05 MIU/L-ACNC: 0.22 UIU/ML (ref 0.3–5)
URINE TOTAL PROTEIN CREATININE RATIO: 39.54 (ref 0–0.2)
WBC # BLD: 10.3 K/UL (ref 3.5–11.3)
WBC # BLD: 6.4 K/UL (ref 3.5–11.3)

## 2022-06-18 PROCEDURE — A4216 STERILE WATER/SALINE, 10 ML: HCPCS | Performed by: STUDENT IN AN ORGANIZED HEALTH CARE EDUCATION/TRAINING PROGRAM

## 2022-06-18 PROCEDURE — 84484 ASSAY OF TROPONIN QUANT: CPT

## 2022-06-18 PROCEDURE — 70450 CT HEAD/BRAIN W/O DYE: CPT

## 2022-06-18 PROCEDURE — 84439 ASSAY OF FREE THYROXINE: CPT

## 2022-06-18 PROCEDURE — 84443 ASSAY THYROID STIM HORMONE: CPT

## 2022-06-18 PROCEDURE — 6360000002 HC RX W HCPCS

## 2022-06-18 PROCEDURE — 2500000003 HC RX 250 WO HCPCS: Performed by: STUDENT IN AN ORGANIZED HEALTH CARE EDUCATION/TRAINING PROGRAM

## 2022-06-18 PROCEDURE — 2580000003 HC RX 258: Performed by: NURSE ANESTHETIST, CERTIFIED REGISTERED

## 2022-06-18 PROCEDURE — 93306 TTE W/DOPPLER COMPLETE: CPT

## 2022-06-18 PROCEDURE — 86901 BLOOD TYPING SEROLOGIC RH(D): CPT

## 2022-06-18 PROCEDURE — 6360000002 HC RX W HCPCS: Performed by: STUDENT IN AN ORGANIZED HEALTH CARE EDUCATION/TRAINING PROGRAM

## 2022-06-18 PROCEDURE — 2580000003 HC RX 258: Performed by: STUDENT IN AN ORGANIZED HEALTH CARE EDUCATION/TRAINING PROGRAM

## 2022-06-18 PROCEDURE — 83880 ASSAY OF NATRIURETIC PEPTIDE: CPT

## 2022-06-18 PROCEDURE — 7100000000 HC PACU RECOVERY - FIRST 15 MIN: Performed by: OBSTETRICS & GYNECOLOGY

## 2022-06-18 PROCEDURE — 87635 SARS-COV-2 COVID-19 AMP PRB: CPT

## 2022-06-18 PROCEDURE — 70496 CT ANGIOGRAPHY HEAD: CPT

## 2022-06-18 PROCEDURE — 3700000000 HC ANESTHESIA ATTENDED CARE: Performed by: OBSTETRICS & GYNECOLOGY

## 2022-06-18 PROCEDURE — 96375 TX/PRO/DX INJ NEW DRUG ADDON: CPT

## 2022-06-18 PROCEDURE — 7100000001 HC PACU RECOVERY - ADDTL 15 MIN: Performed by: OBSTETRICS & GYNECOLOGY

## 2022-06-18 PROCEDURE — 96372 THER/PROPH/DIAG INJ SC/IM: CPT

## 2022-06-18 PROCEDURE — 85025 COMPLETE CBC W/AUTO DIFF WBC: CPT

## 2022-06-18 PROCEDURE — 6360000004 HC RX CONTRAST MEDICATION: Performed by: STUDENT IN AN ORGANIZED HEALTH CARE EDUCATION/TRAINING PROGRAM

## 2022-06-18 PROCEDURE — 87081 CULTURE SCREEN ONLY: CPT

## 2022-06-18 PROCEDURE — 96376 TX/PRO/DX INJ SAME DRUG ADON: CPT

## 2022-06-18 PROCEDURE — 84156 ASSAY OF PROTEIN URINE: CPT

## 2022-06-18 PROCEDURE — 36415 COLL VENOUS BLD VENIPUNCTURE: CPT

## 2022-06-18 PROCEDURE — 99253 IP/OBS CNSLTJ NEW/EST LOW 45: CPT | Performed by: PEDIATRICS

## 2022-06-18 PROCEDURE — 6360000002 HC RX W HCPCS: Performed by: NURSE ANESTHETIST, CERTIFIED REGISTERED

## 2022-06-18 PROCEDURE — 80053 COMPREHEN METABOLIC PANEL: CPT

## 2022-06-18 PROCEDURE — 82570 ASSAY OF URINE CREATININE: CPT

## 2022-06-18 PROCEDURE — 3700000001 HC ADD 15 MINUTES (ANESTHESIA): Performed by: OBSTETRICS & GYNECOLOGY

## 2022-06-18 PROCEDURE — 86850 RBC ANTIBODY SCREEN: CPT

## 2022-06-18 PROCEDURE — 86900 BLOOD TYPING SEROLOGIC ABO: CPT

## 2022-06-18 PROCEDURE — 1220000000 HC SEMI PRIVATE OB R&B

## 2022-06-18 PROCEDURE — 93005 ELECTROCARDIOGRAM TRACING: CPT | Performed by: STUDENT IN AN ORGANIZED HEALTH CARE EDUCATION/TRAINING PROGRAM

## 2022-06-18 PROCEDURE — 80307 DRUG TEST PRSMV CHEM ANLYZR: CPT

## 2022-06-18 PROCEDURE — 96374 THER/PROPH/DIAG INJ IV PUSH: CPT

## 2022-06-18 PROCEDURE — 88307 TISSUE EXAM BY PATHOLOGIST: CPT

## 2022-06-18 PROCEDURE — 3609079900 HC CESAREAN SECTION: Performed by: OBSTETRICS & GYNECOLOGY

## 2022-06-18 PROCEDURE — 71260 CT THORAX DX C+: CPT

## 2022-06-18 PROCEDURE — 2709999900 HC NON-CHARGEABLE SUPPLY: Performed by: OBSTETRICS & GYNECOLOGY

## 2022-06-18 PROCEDURE — 6370000000 HC RX 637 (ALT 250 FOR IP): Performed by: STUDENT IN AN ORGANIZED HEALTH CARE EDUCATION/TRAINING PROGRAM

## 2022-06-18 PROCEDURE — 83735 ASSAY OF MAGNESIUM: CPT

## 2022-06-18 PROCEDURE — 2500000003 HC RX 250 WO HCPCS: Performed by: NURSE ANESTHETIST, CERTIFIED REGISTERED

## 2022-06-18 PROCEDURE — 86780 TREPONEMA PALLIDUM: CPT

## 2022-06-18 RX ORDER — SODIUM CHLORIDE 0.9 % (FLUSH) 0.9 %
5-40 SYRINGE (ML) INJECTION EVERY 12 HOURS SCHEDULED
Status: DISCONTINUED | OUTPATIENT
Start: 2022-06-18 | End: 2022-06-19

## 2022-06-18 RX ORDER — NIFEDIPINE 30 MG/1
30 TABLET, EXTENDED RELEASE ORAL DAILY
Status: DISCONTINUED | OUTPATIENT
Start: 2022-06-18 | End: 2022-06-18

## 2022-06-18 RX ORDER — SODIUM CHLORIDE 0.9 % (FLUSH) 0.9 %
5-40 SYRINGE (ML) INJECTION PRN
Status: DISCONTINUED | OUTPATIENT
Start: 2022-06-18 | End: 2022-06-19

## 2022-06-18 RX ORDER — ONDANSETRON 2 MG/ML
4 INJECTION INTRAMUSCULAR; INTRAVENOUS EVERY 6 HOURS PRN
Status: DISCONTINUED | OUTPATIENT
Start: 2022-06-18 | End: 2022-06-19

## 2022-06-18 RX ORDER — LIDOCAINE HYDROCHLORIDE 10 MG/ML
30 INJECTION, SOLUTION EPIDURAL; INFILTRATION; INTRACAUDAL; PERINEURAL PRN
Status: DISCONTINUED | OUTPATIENT
Start: 2022-06-18 | End: 2022-06-19

## 2022-06-18 RX ORDER — SODIUM CHLORIDE 0.9 % (FLUSH) 0.9 %
10 SYRINGE (ML) INJECTION EVERY 12 HOURS SCHEDULED
Status: DISCONTINUED | OUTPATIENT
Start: 2022-06-18 | End: 2022-06-19

## 2022-06-18 RX ORDER — SODIUM CHLORIDE 9 MG/ML
INJECTION, SOLUTION INTRAVENOUS PRN
Status: DISCONTINUED | OUTPATIENT
Start: 2022-06-18 | End: 2022-06-19

## 2022-06-18 RX ORDER — VITAMIN A, ASCORBIC ACID, CHOLECALCIFEROL, .ALPHA.-TOCOPHEROL ACETATE, DL-, THIAMINE MONONITRATE, RIBOFLAVIN, NIACINAMIDE, PYRIDOXINE HYDROCHLORIDE, FOLIC ACID, CYANOCOBALAMIN, CALCIUM CARBONATE, IRON, ZINC OXIDE, AND CUPRIC OXIDE 4000; 120; 400; 22; 1.84; 3; 20; 10; 1; 12; 200; 29; 25; 2 [IU]/1; MG/1; [IU]/1; [IU]/1; MG/1; MG/1; MG/1; MG/1; MG/1; UG/1; MG/1; MG/1; MG/1; MG/1
1 TABLET ORAL DAILY
Status: DISCONTINUED | OUTPATIENT
Start: 2022-06-19 | End: 2022-06-19

## 2022-06-18 RX ORDER — BUPIVACAINE HYDROCHLORIDE 7.5 MG/ML
INJECTION, SOLUTION INTRASPINAL PRN
Status: DISCONTINUED | OUTPATIENT
Start: 2022-06-18 | End: 2022-06-19 | Stop reason: SDUPTHER

## 2022-06-18 RX ORDER — CETIRIZINE HYDROCHLORIDE 10 MG/1
10 TABLET ORAL NIGHTLY
COMMUNITY
Start: 2022-03-23

## 2022-06-18 RX ORDER — MAGNESIUM SULFATE HEPTAHYDRATE 40 MG/ML
4000 INJECTION, SOLUTION INTRAVENOUS ONCE
Status: COMPLETED | OUTPATIENT
Start: 2022-06-18 | End: 2022-06-18

## 2022-06-18 RX ORDER — HYDRALAZINE HYDROCHLORIDE 20 MG/ML
10 INJECTION INTRAMUSCULAR; INTRAVENOUS ONCE
Status: COMPLETED | OUTPATIENT
Start: 2022-06-18 | End: 2022-06-18

## 2022-06-18 RX ORDER — CALCIUM GLUCONATE 94 MG/ML
1000 INJECTION, SOLUTION INTRAVENOUS PRN
Status: DISCONTINUED | OUTPATIENT
Start: 2022-06-18 | End: 2022-06-19

## 2022-06-18 RX ORDER — CALCIUM CARBONATE 200(500)MG
500 TABLET,CHEWABLE ORAL 3 TIMES DAILY PRN
Status: DISCONTINUED | OUTPATIENT
Start: 2022-06-18 | End: 2022-06-19

## 2022-06-18 RX ORDER — TRISODIUM CITRATE DIHYDRATE AND CITRIC ACID MONOHYDRATE 500; 334 MG/5ML; MG/5ML
30 SOLUTION ORAL ONCE
Status: COMPLETED | OUTPATIENT
Start: 2022-06-18 | End: 2022-06-18

## 2022-06-18 RX ORDER — SODIUM CHLORIDE 0.9 % (FLUSH) 0.9 %
10 SYRINGE (ML) INJECTION PRN
Status: DISCONTINUED | OUTPATIENT
Start: 2022-06-18 | End: 2022-06-19

## 2022-06-18 RX ORDER — FUROSEMIDE 10 MG/ML
40 INJECTION INTRAMUSCULAR; INTRAVENOUS ONCE
Status: COMPLETED | OUTPATIENT
Start: 2022-06-18 | End: 2022-06-18

## 2022-06-18 RX ORDER — PYRIDOXINE HCL (VITAMIN B6) 50 MG
25 TABLET ORAL DAILY
COMMUNITY
Start: 2022-03-23

## 2022-06-18 RX ORDER — LABETALOL HYDROCHLORIDE 5 MG/ML
20 INJECTION, SOLUTION INTRAVENOUS ONCE
Status: COMPLETED | OUTPATIENT
Start: 2022-06-18 | End: 2022-06-18

## 2022-06-18 RX ORDER — SODIUM CHLORIDE 9 MG/ML
INJECTION, SOLUTION INTRAVENOUS CONTINUOUS
Status: DISCONTINUED | OUTPATIENT
Start: 2022-06-18 | End: 2022-06-19

## 2022-06-18 RX ORDER — HYDRALAZINE HYDROCHLORIDE 20 MG/ML
5 INJECTION INTRAMUSCULAR; INTRAVENOUS ONCE
Status: COMPLETED | OUTPATIENT
Start: 2022-06-18 | End: 2022-06-18

## 2022-06-18 RX ORDER — BETAMETHASONE SODIUM PHOSPHATE AND BETAMETHASONE ACETATE 3; 3 MG/ML; MG/ML
12 INJECTION, SUSPENSION INTRA-ARTICULAR; INTRALESIONAL; INTRAMUSCULAR; SOFT TISSUE EVERY 24 HOURS
Status: DISCONTINUED | OUTPATIENT
Start: 2022-06-18 | End: 2022-06-19

## 2022-06-18 RX ORDER — NIFEDIPINE 30 MG/1
60 TABLET, EXTENDED RELEASE ORAL DAILY
Status: DISCONTINUED | OUTPATIENT
Start: 2022-06-19 | End: 2022-06-19

## 2022-06-18 RX ORDER — UREA 10 %
200 LOTION (ML) TOPICAL DAILY
COMMUNITY
Start: 2022-03-23

## 2022-06-18 RX ORDER — SODIUM CHLORIDE 9 MG/ML
25 INJECTION, SOLUTION INTRAVENOUS PRN
Status: DISCONTINUED | OUTPATIENT
Start: 2022-06-18 | End: 2022-06-19

## 2022-06-18 RX ORDER — NIFEDIPINE 10 MG/1
10 CAPSULE ORAL ONCE
Status: COMPLETED | OUTPATIENT
Start: 2022-06-18 | End: 2022-06-18

## 2022-06-18 RX ORDER — DIPHENHYDRAMINE HCL 25 MG
25 CAPSULE ORAL EVERY 6 HOURS PRN
COMMUNITY

## 2022-06-18 RX ORDER — SODIUM CHLORIDE 0.9 % (FLUSH) 0.9 %
5-40 SYRINGE (ML) INJECTION PRN
Status: DISCONTINUED | OUTPATIENT
Start: 2022-06-18 | End: 2022-06-18 | Stop reason: SDUPTHER

## 2022-06-18 RX ORDER — HYDRALAZINE HYDROCHLORIDE 20 MG/ML
10 INJECTION INTRAMUSCULAR; INTRAVENOUS EVERY 6 HOURS PRN
Status: DISCONTINUED | OUTPATIENT
Start: 2022-06-18 | End: 2022-06-18

## 2022-06-18 RX ORDER — HYDRALAZINE HYDROCHLORIDE 20 MG/ML
INJECTION INTRAMUSCULAR; INTRAVENOUS
Status: COMPLETED
Start: 2022-06-18 | End: 2022-06-18

## 2022-06-18 RX ORDER — HYDRALAZINE HYDROCHLORIDE 20 MG/ML
5 INJECTION INTRAMUSCULAR; INTRAVENOUS ONCE
Status: DISCONTINUED | OUTPATIENT
Start: 2022-06-18 | End: 2022-06-18

## 2022-06-18 RX ORDER — PHENYLEPHRINE HCL IN 0.9% NACL 1 MG/10 ML
SYRINGE (ML) INTRAVENOUS PRN
Status: DISCONTINUED | OUTPATIENT
Start: 2022-06-18 | End: 2022-06-19 | Stop reason: SDUPTHER

## 2022-06-18 RX ORDER — SODIUM CHLORIDE 0.9 % (FLUSH) 0.9 %
5-40 SYRINGE (ML) INJECTION EVERY 12 HOURS SCHEDULED
Status: DISCONTINUED | OUTPATIENT
Start: 2022-06-18 | End: 2022-06-18 | Stop reason: SDUPTHER

## 2022-06-18 RX ORDER — FAMOTIDINE 20 MG/1
20 TABLET, FILM COATED ORAL 2 TIMES DAILY
COMMUNITY
Start: 2022-03-24

## 2022-06-18 RX ORDER — ACETAMINOPHEN 325 MG/1
975 TABLET ORAL ONCE
Status: DISCONTINUED | OUTPATIENT
Start: 2022-06-18 | End: 2022-06-19

## 2022-06-18 RX ORDER — DIPHENHYDRAMINE HCL 25 MG
25 TABLET ORAL EVERY 4 HOURS PRN
Status: DISCONTINUED | OUTPATIENT
Start: 2022-06-18 | End: 2022-06-19

## 2022-06-18 RX ORDER — SODIUM CHLORIDE, SODIUM LACTATE, POTASSIUM CHLORIDE, CALCIUM CHLORIDE 600; 310; 30; 20 MG/100ML; MG/100ML; MG/100ML; MG/100ML
INJECTION, SOLUTION INTRAVENOUS CONTINUOUS PRN
Status: DISCONTINUED | OUTPATIENT
Start: 2022-06-18 | End: 2022-06-19 | Stop reason: SDUPTHER

## 2022-06-18 RX ORDER — ONDANSETRON 2 MG/ML
4 INJECTION INTRAMUSCULAR; INTRAVENOUS EVERY 6 HOURS PRN
Status: DISCONTINUED | OUTPATIENT
Start: 2022-06-18 | End: 2022-06-18

## 2022-06-18 RX ORDER — NIFEDIPINE 30 MG/1
30 TABLET, EXTENDED RELEASE ORAL ONCE
Status: COMPLETED | OUTPATIENT
Start: 2022-06-18 | End: 2022-06-18

## 2022-06-18 RX ORDER — MORPHINE SULFATE 1 MG/ML
INJECTION, SOLUTION EPIDURAL; INTRATHECAL; INTRAVENOUS PRN
Status: DISCONTINUED | OUTPATIENT
Start: 2022-06-18 | End: 2022-06-19 | Stop reason: SDUPTHER

## 2022-06-18 RX ORDER — ACETAMINOPHEN 500 MG
1000 TABLET ORAL EVERY 6 HOURS PRN
Status: DISCONTINUED | OUTPATIENT
Start: 2022-06-18 | End: 2022-06-19

## 2022-06-18 RX ADMIN — BUPIVACAINE HYDROCHLORIDE IN DEXTROSE 1.8 ML: 7.5 INJECTION, SOLUTION SUBARACHNOID at 23:21

## 2022-06-18 RX ADMIN — NIFEDIPINE 10 MG: 10 CAPSULE ORAL at 20:10

## 2022-06-18 RX ADMIN — Medication 2000 MG: at 22:38

## 2022-06-18 RX ADMIN — SODIUM CHLORIDE, POTASSIUM CHLORIDE, SODIUM LACTATE AND CALCIUM CHLORIDE: 600; 310; 30; 20 INJECTION, SOLUTION INTRAVENOUS at 23:07

## 2022-06-18 RX ADMIN — HYDRALAZINE HYDROCHLORIDE 5 MG: 20 INJECTION, SOLUTION INTRAMUSCULAR; INTRAVENOUS at 18:26

## 2022-06-18 RX ADMIN — MORPHINE SULFATE 0.2 MG: 1 INJECTION, SOLUTION EPIDURAL; INTRATHECAL; INTRAVENOUS at 23:21

## 2022-06-18 RX ADMIN — HYDRALAZINE HYDROCHLORIDE 10 MG: 20 INJECTION INTRAMUSCULAR; INTRAVENOUS at 16:21

## 2022-06-18 RX ADMIN — HYDRALAZINE HYDROCHLORIDE 10 MG: 20 INJECTION INTRAMUSCULAR; INTRAVENOUS at 16:53

## 2022-06-18 RX ADMIN — SODIUM CITRATE AND CITRIC ACID MONOHYDRATE 30 ML: 500; 334 SOLUTION ORAL at 22:37

## 2022-06-18 RX ADMIN — HYDRALAZINE HYDROCHLORIDE 5 MG: 20 INJECTION INTRAMUSCULAR; INTRAVENOUS at 18:26

## 2022-06-18 RX ADMIN — HYDRALAZINE HYDROCHLORIDE 10 MG: 20 INJECTION INTRAMUSCULAR; INTRAVENOUS at 14:10

## 2022-06-18 RX ADMIN — Medication 200 MCG: at 23:27

## 2022-06-18 RX ADMIN — NIFEDIPINE 30 MG: 30 TABLET, FILM COATED, EXTENDED RELEASE ORAL at 17:18

## 2022-06-18 RX ADMIN — FAMOTIDINE 20 MG: 10 INJECTION, SOLUTION INTRAVENOUS at 22:38

## 2022-06-18 RX ADMIN — Medication 200 MCG: at 23:30

## 2022-06-18 RX ADMIN — BETAMETHASONE SODIUM PHOSPHATE AND BETAMETHASONE ACETATE 12 MG: 3; 3 INJECTION, SUSPENSION INTRA-ARTICULAR; INTRALESIONAL; INTRAMUSCULAR; SOFT TISSUE at 14:43

## 2022-06-18 RX ADMIN — FUROSEMIDE 40 MG: 10 INJECTION, SOLUTION INTRAMUSCULAR; INTRAVENOUS at 18:48

## 2022-06-18 RX ADMIN — Medication 20 MG: at 21:33

## 2022-06-18 RX ADMIN — MAGNESIUM SULFATE HEPTAHYDRATE 1000 MG/HR: 40 INJECTION, SOLUTION INTRAVENOUS at 18:18

## 2022-06-18 RX ADMIN — Medication 909 ML/HR: at 23:52

## 2022-06-18 RX ADMIN — ACETAMINOPHEN 1000 MG: 500 TABLET ORAL at 21:33

## 2022-06-18 RX ADMIN — NIFEDIPINE 30 MG: 30 TABLET, FILM COATED, EXTENDED RELEASE ORAL at 14:05

## 2022-06-18 RX ADMIN — MAGNESIUM SULFATE IN WATER 4000 MG: 40 INJECTION, SOLUTION INTRAVENOUS at 17:57

## 2022-06-18 RX ADMIN — HYDRALAZINE HYDROCHLORIDE 5 MG: 20 INJECTION INTRAMUSCULAR; INTRAVENOUS at 13:45

## 2022-06-18 RX ADMIN — Medication 500 MG: at 23:15

## 2022-06-18 RX ADMIN — IOPAMIDOL 160 ML: 755 INJECTION, SOLUTION INTRAVENOUS at 15:28

## 2022-06-18 RX ADMIN — SODIUM CHLORIDE: 9 INJECTION, SOLUTION INTRAVENOUS at 14:00

## 2022-06-18 RX ADMIN — Medication 100 MCG: at 23:35

## 2022-06-18 ASSESSMENT — PAIN SCALES - GENERAL: PAINLEVEL_OUTOF10: 3

## 2022-06-18 NOTE — FLOWSHEET NOTE
06/18/22 1715   Maternal Vitals (MEW-T)   BP (!) 177/86   Dr. Vic Fox and Dr. Yasmine Turner updated.   Will repeat BP at 1740 as ordered per Dr. Vic Fox

## 2022-06-18 NOTE — PROGRESS NOTES
Patient is seen and examined at bedside. C/o chest pain - Atypical in nature.,   ECG - Sinus bradycardia without ST T wave changes. Mildly elevated HS troponin due to hypertensive urgency. Will obtain 2 D ECHO. Will give 40 mg IV lasix once.      Kathie Ruiz  CV fellow

## 2022-06-18 NOTE — FLOWSHEET NOTE
06/18/22 1845   Maternal Vitals (MEW-T)   BP (!) 182/86   Dr. Shira Rangel aware, here at bedside, states no further hydralazine order at this time

## 2022-06-18 NOTE — CONSULTS
Attestation signed by      Attending Physician Statement:    I have discussed the care of  Magno Holder , including pertinent history and exam findings, with the Cardiology fellow/resident. I have seen and examined the patient and the key elements of all parts of the encounter have been performed by me. I agree with the assessment, plan and orders as documented by the fellow/resident, after I modified exam findings and plan of treatments, and the final version is my approved version of the assessment. Additional Comments: Patient seen and examined, s/p CS, severe hypertension. No chest pain, echo preserved EF. Continue  Current medications. IF BP remains high, will add PO Hydralazine. Will follow. [facility]    Consult / H&P               Today's Date: 6/18/2022  Patient Name: Magno Holder  Date of admission: 6/18/2022 12:50 PM  Patient's age: 42 y.o., 1985  Admission Dx: Chronic hypertension [I10]    Reason for Consult:  Cardiac evaluation    Requesting Physician: Zhanna Truong DO    CHIEF COMPLAINT:   is on Coumadin performed. Male with vena Tachycardia do not check troponin more Aorta currently WBC but    History Obtained From:  patient    HISTORY OF PRESENT ILLNESS:    64-year-old female who is admitted for management of hypertensive urgency. Patient is 27-week pregnant. Cardiology was consulted because episode of chest pain and shortness of breath. Patient was complaining of chest pain. Sharp in nature. Worsened with respiration. ECG showed sinus bradycardia without significant ST-T wave changes suggestive of ischemia. No history of coronary artery disease or peripartum cardiomyopathy in the past.  High-sensitivity troponin was 20 likely due to hypertensive urgency. Has history of smoking and marijuana use. Patient has history of high blood pressure during pregnancy. Was on nifedipine at home.     Past Medical History:   has a past medical history of Chronic hypertension (meds), History of  x2, History of pre-eclampsia x2, and Hypertension. Past Surgical History:   has a past surgical history that includes  section. Home Medications:    Prior to Admission medications    Medication Sig Start Date End Date Taking?  Authorizing Provider   calcium carbonate (OS-BAR) 1250 (500 Ca) MG chewable tablet Take 200 mg by mouth daily 3/23/22  Yes Historical Provider, MD   cetirizine (ZYRTEC) 10 MG tablet Take 10 mg by mouth nightly 3/23/22  Yes Historical Provider, MD   famotidine (PEPCID) 20 MG tablet Take 20 mg by mouth 2 times daily 3/24/22  Yes Historical Provider, MD   pyridoxine (B-6) 50 MG tablet Take 25 mg by mouth daily 3/23/22  Yes Historical Provider, MD   diphenhydrAMINE (BENADRYL) 25 MG capsule Take 25 mg by mouth every 6 hours as needed    Historical Provider, MD   acetaminophen (TYLENOL) 500 MG tablet Take 2 tablets by mouth every 6 hours as needed for Pain 21   Mehrdad Gómez MD   lidocaine (LIDODERM) 5 % Place 1 patch onto the skin daily 12 hours on, 12 hours off. 21   Mehrdad Gómez MD      Current Facility-Administered Medications: sodium chloride flush 0.9 % injection 5-40 mL, 5-40 mL, IntraVENous, 2 times per day  sodium chloride flush 0.9 % injection 5-40 mL, 5-40 mL, IntraVENous, PRN  0.9 % sodium chloride infusion, 25 mL, IntraVENous, PRN  lidocaine PF 1 % injection 30 mL, 30 mL, Other, PRN  ondansetron (ZOFRAN) injection 4 mg, 4 mg, IntraVENous, Q6H PRN  diphenhydrAMINE (BENADRYL) tablet 25 mg, 25 mg, Oral, Q4H PRN  acetaminophen (TYLENOL) tablet 1,000 mg, 1,000 mg, Oral, Q6H PRN  benzocaine-menthol (DERMOPLAST) 20-0.5 % spray, , Topical, PRN  0.9 % sodium chloride infusion, , IntraVENous, Continuous  [START ON 2022] prenatal vitamin plus iron 29-1 MG tablet 1 tablet, 1 tablet, Oral, Daily  calcium carbonate (TUMS) chewable tablet 500 mg, 500 mg, Oral, TID PRN  betamethasone acetate-betamethasone sodium phosphate (CELESTONE) injection 12 mg, 12 mg, IntraMUSCular, Q24H  0.9 % sodium chloride infusion, , IntraVENous, PRN  magnesium sulfate (18018 mg/500mL infusion), 1,000 mg/hr, IntraVENous, Continuous  calcium gluconate 10 % injection 1,000 mg, 1,000 mg, IntraVENous, PRN  [START ON 6/19/2022] NIFEdipine (PROCARDIA XL) extended release tablet 60 mg, 60 mg, Oral, Daily    Allergies:  Asa [aspirin], Bee pollen, Dust mite extract, Flour, Potassium-containing compounds, Shellfish-derived products, and Strawberry extract    Social History:   reports that she has been smoking cigarettes. She has been smoking about 0.25 packs per day. She has never used smokeless tobacco. She reports previous alcohol use. She reports current drug use. Drug: Marijuana Marya Ing). Family History: family history is not on file. No h/o sudden cardiac death. No for premature CAD    REVIEW OF SYSTEMS:    · Constitutional: there has been no unanticipated weight loss. There's been No change in energy level, No change in activity level. · Eyes: No visual changes or diplopia. No scleral icterus. · ENT: No Headaches  · Cardiovascular: No cardiac history  · Respiratory: No previous pulmonary problems, No cough  · Gastrointestinal: No abdominal pain. No change in bowel or bladder habits. PHYSICAL EXAM:      BP (!) 174/88   Pulse 59   Temp 98.3 °F (36.8 °C) (Oral)   Resp 17   SpO2 94%    Constitutional and General Appearance: alert, cooperative, no distress and appears stated age  HEENT: PERRL, no cervical lymphadenopathy. No masses palpable. Normal oral mucosa  Respiratory:  · Normal excursion and expansion without use of accessory muscles  · Resp Auscultation: Good respiratory effort. No for increased work of breathing.  On auscultation: clear to auscultation bilaterally  Cardiovascular:  · The apical impulse is not displaced  · Heart tones are crisp and normal. regular S1 and S2.  · ll   ·   Extremities:  ·  No Cyanosis or Clubbing  ·  Lower extremity edema: No  ·  Skin: Warm and dry    DATA:      Labs:     CBC:   Recent Labs     22  1357   WBC 6.4   HGB 12.1   HCT 36.5        BMP:   Recent Labs     22  1357      K 3.7   CO2 20   BUN 11   CREATININE 1.15*   LABGLOM 53*   GLUCOSE 74     BNP: No results for input(s): BNP in the last 72 hours. PT/INR: No results for input(s): PROTIME, INR in the last 72 hours. APTT:No results for input(s): APTT in the last 72 hours. CARDIAC ENZYMES:No results for input(s): CKTOTAL, CKMB, CKMBINDEX, TROPONINI in the last 72 hours. FASTING LIPID PANEL:  Lab Results   Component Value Date    HDL 54 2017    TRIG 106 2017     LIVER PROFILE:  Recent Labs     22  1357   AST 29   ALT 46*   LABALBU 3.5       IMPRESSION:    Patient Active Problem List   Diagnosis    History of  x2    Chronic hypertension (meds)    History of indicated  delivery    History of pre-eclampsia x2    + HPV     AMA      Late prenatal care    History Fetal growth restriction     THC Use     Tobacco use    Anemia affecting pregnancy in second trimester       Assessment       1. Chest pain   2  SOB   3  at 27w3d  4 Hypertensive urgency   5 Elevated pro BNP   6 H/o Smoking     RECOMMENDATIONS:  1. ECG showed sinus bradycardia no significant ST-T wave changes suggestive of ischemia. ECHO showed preserved LVEF. Patient has elevated proBNP. 2. Chest pain is atypical in nature. No history of coronary disease in the past.  3. Mildly elevated high-sensitivity troponin due to hypertensive urgency. On nifedipine and IV hydralazine for blood pressure control. 4. Patient is being transferred to MICU for management of hypertensive urgency. Discussed with patient and Nurse.     Electronically signed by Itzel Renee MD on 2022 at 7:18 PM    [facility]      [phone]

## 2022-06-18 NOTE — FLOWSHEET NOTE
06/18/22 1900   Maternal Vitals (MEW-T)   BP (!) 174/88   Dr. Shiloh Kline and Dr. Ollie Ruano aware. No further orders for hydralazine, pt has received total of 40 mg. Dr. Ollie Ruano okay with repeating BP in 30 mins, following [facility] protocol.

## 2022-06-18 NOTE — FLOWSHEET NOTE
06/18/22 1815   Maternal Vitals (MEW-T)   BP (!) 175/90   Dr. Nannette Vail and Dr. Adrianna Joshi aware, orders received.

## 2022-06-18 NOTE — FLOWSHEET NOTE
06/18/22 1330   Maternal Vitals (MEW-T)   BP (!) 208/91   Dr. Delon Phillips and Dr. Ishan Shepherd notified, orders for hydralazine received.

## 2022-06-18 NOTE — FLOWSHEET NOTE
06/18/22 1405   Maternal Vitals (MEW-T)   BP (!) 203/108   Dr. Jameson Urena udpated, orders received.

## 2022-06-18 NOTE — H&P
negative palpitations, negative arrhythmia, negative syncope   Gastrointestinal: positive radiating abdominal and epigastric pain, negative RUQ pain, negative N/V, negative diarrhea, negative constipation, negative bowel changes, negative heartburn   Genitourinary: negative dysuria, negative hematuria, negative urinary incontinence, negative vaginal discharge, negative vaginal bleeding or spotting  Dermatological: negative rash, negative pruritis, negative mole or other skin changes, positive chronic boils  Hematologic: negative bruising  Immunologic/Lymphatic: negative recent illness, negative recent sick contact  Musculoskeletal: negative back pain, negative myalgias, negative arthralgias  Neurological:  negative dizziness, negative migraines, negative seizures, negative weakness  Behavior/Psych: negative depression, negative anxiety, negative SI, negative HI    OBSTETRICAL HISTORY:   OB History    Para Term  AB Living   3 2 1 1 0 2   SAB IAB Ectopic Molar Multiple Live Births   0 0 0 0 0 2      # Outcome Date GA Lbr Ezio/2nd Weight Sex Delivery Anes PTL Lv   3 Current            2 Term 18 38w0d  5 lb 2.2 oz (2.33 kg) M CS-LTranv   KATIE   1  12 26w0d  1 lb 4 oz (0.567 kg)  CS-LTranv   KATIE      Complications: Pre-eclampsia, severe, with delivery       PAST MEDICAL HISTORY:   has a past medical history of Chronic hypertension (meds), History of  x2, History of pre-eclampsia x2, and Hypertension. PAST SURGICAL HISTORY:   has a past surgical history that includes  section. ALLERGIES:  is allergic to asa [aspirin], bee pollen, dust mite extract, flour, potassium-containing compounds, shellfish-derived products, and strawberry extract. MEDICATIONS:  Prior to Admission medications    Medication Sig Start Date End Date Taking?  Authorizing Provider   calcium carbonate (OS-BAR) 1250 (500 Ca) MG chewable tablet Take 200 mg by mouth daily 3/23/22  Yes Historical Provider, MD   cetirizine (ZYRTEC) 10 MG tablet Take 10 mg by mouth nightly 3/23/22  Yes Historical Provider, MD   famotidine (PEPCID) 20 MG tablet Take 20 mg by mouth 2 times daily 3/24/22  Yes Historical Provider, MD   pyridoxine (B-6) 50 MG tablet Take 25 mg by mouth daily 3/23/22  Yes Historical Provider, MD   diphenhydrAMINE (BENADRYL) 25 MG capsule Take 25 mg by mouth every 6 hours as needed    Historical Provider, MD   acetaminophen (TYLENOL) 500 MG tablet Take 2 tablets by mouth every 6 hours as needed for Pain 4/17/21   Silvano Hill MD   lidocaine (LIDODERM) 5 % Place 1 patch onto the skin daily 12 hours on, 12 hours off. 4/17/21   Silvano Hill MD       FAMILY HISTORY:  family history is not on file. SOCIAL HISTORY:   reports that she has been smoking cigarettes. She has been smoking about 0.25 packs per day. She has never used smokeless tobacco. She reports previous alcohol use. She reports current drug use. Drug: Marijuana Olga Ryan).     VITALS:  Vitals:    06/18/22 1745 06/18/22 1800 06/18/22 1815 06/18/22 1830   BP:  (!) 165/77 (!) 175/90 (!) 182/92   Pulse:  60 68 62   Resp:  19 18 19   Temp:       TempSrc:       SpO2: 98% 98% 97% 98%         PHYSICAL EXAM:  Fetal Heart Monitor:  Baseline Heart Rate 145, moderate to minimal variability, absent accelerations, absent decelerations  [place]: contractions, none    General appearance: nontoxic, but patient appears tired and uncomfortable, alert and cooperative, poor hygiene  HEENT: head atraumatic, normocephalic, moist mucous membranes, trachea midline  Neurologic:  alert, oriented, normal speech, no focal findings or movement disorder noted  Lungs:  Mildly increased work of breathing, diminished breath sounds  Heart:  regular rate and rhythm and no murmur, rubs, gallops  Abdomen:  soft, gravid, mildly tender to palpation, no rebound, guarding, or rigidity, no RUQ or epigastric tenderness, no signs or symptoms of abruption, no signs or symptoms of chorioamnionitis, abdominal scars: consistent with Hx CSx 2, patient morbidly obese  Extremities:  no calf tenderness, non edematous, no varicosities, full range of motion in all four extremities, DTR's: +2/4 bilateral lower extremities   Musculoskeletal: Gross strength equal and intact throughout, no gross abnormalities, range of motion normal in hips, knees, shoulders and spine, CVA tenderness: none  Psychiatric: Mood appropriate, normal affect   Rectal Exam: not indicated  Pelvic Exam: indicated 2/2 abdominal pain but patient unable to tolerate     LIMITED BEDSIDE US:  Position: Cephalic  Placental Location: posterior  Fetal Heart Tones: Present  Fetal Movement: Present  Amniotic Fluid Index/Volume: adequate 2x2 cm fluid pocket  Estimated Fetal Weight:  2 lbs 2oz    PRENATAL LAB RESULTS:   Blood Type/Rh: O pos  Antibody Screen: negative  Hemoglobin, Hematocrit, Platelets: Hgb 12.4/IBS 34.2/Plt 283  Rubella: immune  T. Pallidum, IgG: non-reactive  Hepatitis B Surface Antigen: non-reactive   Hepatitis C Antibody: non-reactive   HIV: non-reactive   Sickle Cell Screen: negative  Gonorrhea: negative  Chlamydia: negative  Urine culture: negative, date: 22    Early 1 hour Glucose Tolerance Test: 83  1 hour Glucose Tolerance Test: not done    Group B Strep: not done   Cystic Fibrosis Screen: Positive for AR carrier  First Trimester Screen: not done  MSAFP/Multiple Markers: negative for NTD  Non-Invasive Prenatal Testing: low risk  Anatomy US: not yet completed    ASSESSMENT & PLAN:  Karla Borges is a 42 y.o. female  27w3d    - GBS unknown / Rh positive / R immune   - No indication for GBS prophylaxis at this time   - Afebrile    Radiating abdominal pain/Chest Pain/Uncontrolled BP   - Patient presented via EMS c/o abdominal pain radiating to her back with accompanying chest pain. Patient states she was laying down with her kids when this started.     - Of noted, initial BP noted to be 214/100 on admission followed by    - HR 40s-50s on presentation   - Sp02 100%   - Patient also reports HA    - Procardia 30 mg XL QD and Hydralazine 5 mg IV ordered   - Patient states she takes Labetalol 200 mg BID and that she took her dose this AM and reports medication compliance   - NPO    - COVID19 neg ()    - BNP 1,578, Troponin 20    - Cr 1.15, ALT 46   - Cardiology consult placed and contacted recommending Lasix and echocardiogram   - Patient unable to tolerate pelvic exam 2/2 symptomatology, specifically SOB   - Given patient's symptoms HARJINDER, PE, Abdominal aortic aneurysm, Intracranial pathology cannot be ruled out. Spoke with Dr. Dawna Arizmendi from [facility] radiology who recommended CT PE w/ contrast and CTA head and US for assessment of patient's aorta. R/B/A discussion regarding radiation in pregnancy. Patient consented to radiation exposure for maternal benefit   - UDS + THC    - Presentation concerning for cardiopulmonary pathology/cHTN with HARJINDER with SF. Work up is ongoing. Will continue to monitor patient's Sx closely. NICU team consulted and CS consent obtained in the case we need to proceed with delivery due to worsening maternal status. Consider MFM US if delivery is not imminent   - Celestone administered for fetal lung maturation    Hx C/S x 2   - G1 indicated  delivery with  section for PreE w/ SF   - Repeat  in G2 pregnancy    - Will need  section if delivery imminent    cHTN (on meds)   - Patient taking Labetalol 200mg TID   - C/o HA diffuse abdominal pain    - PreE labs remarkable for ALT 46, Cr 1.15    - P/C ratio 39   - Will continue to monitor closely    Hx iPTD @ 26 weeks   - Done at [address] for PreE w/ SF    Hx PreE w/ SF    - G1 pregnancy    HR HPV +    - F/u PP per [facility]P guidelines    AMA   - No NIPT completed     Hx FGR    - No MFM US in this pregnancy    Heart Murmur    Hidradenitis Suppurativa    - Exam consistent with this diagnosis.  Multiple large boils under the patient's breat line, on her abdomen, and in her axilla. No obvious signs of infection    Hemorrhoids    - No current complaints     Tobacco use   - Cessation recommended    THC Use   - UDS +   - SW consult PP    Anemia    - Hgb 12.1 on admission    - Asymptomatic    BMI 42    Patient Active Problem List    Diagnosis Date Noted    History of  x2 2022     Priority: Medium    Chronic hypertension (meds) 2022     Priority: Medium     Taking labetalol 200 mg TID      History of pre-eclampsia x2 2022     Priority: Medium    History Fetal growth restriction  2022     Priority: Medium    THC Use  2022     Priority: Medium    Tobacco use 2022     Priority: Medium    Anemia affecting pregnancy in second trimester 2022     Priority: Medium    History of indicated  delivery 2022     G1 @ 26 weeks due to severe preeclampsia       + HPV  2022 tested pos for high risk other HPV. Normal cytology on pap       AMA   2022     NIPT results not available but appears to have been drawn       Late prenatal care 2022     G3- initiated care @ 17 weeks          Plan discussed with Dr. Caesar Palmer, who is agreeable. Steroids given this admission: Yes    Risks, benefits, alternatives and possible complications have been discussed in detail with the patient. Admission, and post admission procedures and expectations were discussed in detail. All questions were answered.     Attending's Name: [facility] [place], [place]  Ob/Gyn Resident  2022, 6:42 PM

## 2022-06-18 NOTE — DISCHARGE SUMMARY
Obstetric Discharge Summary  [address]    Patient Name: Rafa Hager  Patient : 1985  Primary Care Physician: GROVER Durand - CNP  Admit Date: 2022    Principal Diagnosis: IUP at 27w5d, admitted for uncontrolled blood pressures with abdominal pain      Her pregnancy has been complicated by:   Patient Active Problem List   Diagnosis    History of  x2    Chronic hypertension (meds)    History of indicated  delivery    History of pre-eclampsia x2    + HPV     AMA      Late prenatal care    History Fetal growth restriction     THC Use     Tobacco use    Anemia affecting pregnancy in second trimester    RLTCS 22 F APG  Wt 1#12    cHTN (meds) w/ HARJINDER w/ SF (G3)    Pulmonary edema (G3)    Thyroid nodule    Pulmonary hypertension (G3)    Solitary pulmonary nodule on lung CT    High-risk pregnancy in third trimester    Postpartum state    Postoperative state    Multinodular goiter     Infection Present?: No  Hospital Acquired: No    Surgical Operations & Procedures:  Analgesia: spinal  Delivery Type:  Delivery: See Labor and Delivery Summary   Laceration: small 1cm laceration inferior to pfannenstiel incision near lesion on mons pubis covered with steri strips     Consultations:  NICU, Cardiology, Intermed, critical care     Pertinent Findings & Procedures:   Rafa Hager is a 42 y.o. female [id number] at 33w11d admitted for uncontrolled blood pressures with headache, bradycardia and abdominal pain. She had several severe range blood pressures that required treatment with IV antihypertensive medication. She was started on Procardia 30 XL QD as well. EKG showed sinus madelin. She continued to be symptomatic so CT head was performed and showed no acute intracranial abnormality.  CT PE was completed and showed no PE, mild pulm edema with bilateral pleural effusions, enlarged bilateral axillary lymph nodes, solid pulmonary left nodule measuring 5mm, dilation of main pulm artery which can be seen in pulm hypertension, thyroid nodule also seen. First dose of Celestone was given. Admission labs showed TSH of 0.22 with free T4 1.05, Cr elevated at 1.15, ALT elevated at 46, BNP elevated 1578 and trops elevated 20, COVID19 negative. With severe range blood pressures resistant to IV antihypertensives, pulmonary edema and headache patient meets criteria for chroinc HTN with HARJINDER with severe features. Due to elevated Cr, 4g mag bolus followed by 1g/hr infusion was started. Mag lvls were checked every 4 hours. Cardiology was consulted for elevated BNP/Trops with abnormal CT chest imaging. They ordered an echo which showed EF 58%, mild tricuspid regurg, mild pulm HTN- right ventricular pressure 45 mmHg, no pericardial effusion. They also gave patient lasix 40 mg.     Repeat labs showed Cr improved to 1.00, ALT improved 46>43, AST increased 29>35, trops improved 20>18. Mag increased to 2 g/hr     She has received Procardia 30 mg XL PO x2, Hydralazine 5 mg IV x2, Hydralazine 10 mg x3, Procardia 10 mg PO x1, Labetalol 20 mg IV x1. Decision made to proceed to  due to chronic HTN with HARJINDER with severe features with worsening maternal status. Ancef, azithro, pepcid, bictira and Tylenol given pre op. She delivered by repeat low transverse  a Live Born infant on 22. Partial placental abruption noted after delivery. Information for the patient's :  Augustus Gilbert Girl Iam Johnson [[id number]]   female   Birth Weight: 1 lb 12.2 oz (0.8 kg)     Apgars: 2 at 1 minute and 6 at 5 minutes and 7 at 10 minutes     Postpartum course: Mag was continued for 24 hr post op. POD#1: IV labetalol 40 mg IV and 80 mg IV were given for continued severe range blood pressures. HCTZ 25 mg PO was also started. Procardia was increased to 90 mg XL QD.  Despite several doses of IV and PO antihypertensive medications, patient is still having refractory severe range blood pressures. Intermed consulted per Cardiology's request and transfer to critical care has been initiated. Critical care was also consulted. Repeat preeclampsia labs in the morning with increasing creatinine, BNP, and troponin. Repeat labs 6 hours later all downtrending appropriately. The patient continues to require IV antihypertensives well increasing oral antihypertensive regimen. She was then transferred [facility] the [facility] floor. PreE labs continued to improve. POD#2: Patient received Procardia 120XL daily, HCTZ 25mg daily, and Labetalol 200mg BID. PreE labs w/ creatanine 1.18, LFTs trending down now 25/13, platelets 498. Internal medicine was consulted due to newly diagnosed thyroid nodule and pulmonary nodule. Thyroid ultraound showed Enlarged heterogeneous multinodular gland. There are bilateral TR4 and TR3 nodules which meet TI-RADS criteria for an attempt at ultrasound-guided FNA biopsy.  Based on ACR/TI-RADS recommendations, ultrasound-guided FNA biopsy of the 2 largest nodules in the left lobe is suggested with 1 year follow-up for additional nodules. FNA ordered as outpatient. IM recommends outpatient follow up on lung nodule. Cardiology signed off and recommended outpatient follow up. POD#3 PreE labs improving. Cr 1.17. LFTs normal with ALT/AST 18/12. She was given keflex and flagyl x 48 hrs for wound infection prophylaxis  She was given Heparin 7500 mL for DVT prophylaxis     Prevena was replaced on day of discharge     Course of patient: complicated by superimposed preeclampsia, placental abruption, incidental findings of thyroid nodule     Discharge to: Home    Readmission planned: no     Recommendations on Discharge:     Medications:      Medication List      START taking these medications    clindamycin 1 % gel  Commonly known as: Cleocin-T  Apply topically 2 times daily.      docusate sodium 100 MG capsule  Commonly known as: COLACE  Take 1 capsule by mouth 2 times daily as needed for Constipation     oxyCODONE 5 MG immediate release tablet  Commonly known as: Roxicodone  Take 1 tablet by mouth every 6 hours as needed for Pain for up to 5 days. Intended supply: 5 days. Take lowest dose possible to manage pain        CONTINUE taking these medications    acetaminophen 500 MG tablet  Commonly known as: TYLENOL  Take 2 tablets by mouth every 6 hours as needed for Pain     calcium carbonate 1250 (500 Ca) MG chewable tablet  Commonly known as: OS-BAR     cetirizine 10 MG tablet  Commonly known as: ZYRTEC     diphenhydrAMINE 25 MG capsule  Commonly known as: BENADRYL     famotidine 20 MG tablet  Commonly known as: PEPCID     lidocaine 5 %  Commonly known as: Lidoderm  Place 1 patch onto the skin daily 12 hours on, 12 hours off.     pyridoxine 50 MG tablet  Commonly known as: B-6           Where to Get Your Medications      These medications were sent to [facility], [address] [place] [address]  [address] [facility], [place] [address]    Phone: [phone]   · acetaminophen 500 MG tablet  · clindamycin 1 % gel  · docusate sodium 100 MG capsule  · oxyCODONE 5 MG immediate release tablet          Activity: pelvic rest x 6 weeks, no driving on narcotics, no lifting greater than 15 lbs  Diet: regular diet  Follow up: 1 week for Prevena dressing removal and blood pressure check     Condition on discharge: stable    Discharge date: 6/22/22    Stacey Mendez DO  Ob/Gyn Resident    Comments:  Home care and follow-up care were reviewed. Pelvic rest, and birth control were reviewed. Signs and symptoms of mastitis and post partum depression were reviewed. The patient is to notify her physician if any of these occur. The patient was counseled on secondary smoke risks and the increased risk of sudden infant death syndrome and respiratory problems to her baby with exposure.  She was counseled on various alternate recommendations to decrease the exposure to secondary smoke to her children.

## 2022-06-18 NOTE — FLOWSHEET NOTE
06/18/22 1430   Maternal Vitals (MEW-T)   BP (!) 189/104   Dr. Bianca Cleary and Dr. Porsha Carrion both at bedside, aware of BP

## 2022-06-18 NOTE — PROGRESS NOTES
OB/GYN Resident Interval Note      Patient seen and examined at bedside. She is complaining of acute shortness of breath. Lungs were auscultated and breath sounds were diminished in anterior lung fields. She is still having severe range blood pressures. At this point she has received Procardia 30 MG XL PO x1, Hydralazine 5 mg x1 and Hydralazine 10 mg x1. Awaiting repeat BP. Explained the necessity to give Celestone at this time. Patient was agreeable. Awaiting transport for CT chest, abdomen and head. Labs reviewed:   CBC unremarkable: Hgb and plts stable    Cr elevated 1.15   ALT elevated 46   BNP elevated 1578   Trops pos 20    Will consult cardiology due to worsening shortness of breath with increased BNP and troponin.          Dr Kerline Whiting on floor and in agreement with kassy Jones DO   OB/GYN Resident    [address]  6/18/2022, 5:59 PM

## 2022-06-18 NOTE — FLOWSHEET NOTE
Pt presents to L and D, accompanied by [employer], via stretcher. Pt here for c/o abd and back pain, that started this am.    She also states she has had a H/A the past couple days. She states she is a pt of [address] [place] [address] personal report her BP as 200's/ 100's. Pt also states she is known tn, and takes PO Labetolol. Pt gowned and in bed, oriented to room and call light. EFM explained and applied. T's 148. Dr. Tamara Solitario notified of admission.

## 2022-06-18 NOTE — CONSULTS
At the request of OB, I was asked to do a prenatal consult on Erna Hernandez  regarding premature birth and the associated  complications of a 27 3/7 weeks gestation delivery. CC: 27 3/7 weeks GA pregnancy with uncontrolled BPs    HPI: Mother is a 44y.o. year old  3 Para 2 female admitted to Labor and delivery with BPs in the 316V systolic and complaints of abdominal pain radiating to her back. Mother also reports a HA and intermittent SOB with position changes. There is also a hx of limited/no PNC with 1 OB visit this pregnancy. Mother has a Hx of an indicated PTD @ 26 wks for PreE w/ SF. Patient has a Hx of CS x2. MOTHER'S HISTORY AND LABS:  Prenatal care: late/None      PRENATAL LAB RESULTS:   Blood Type/Rh: O pos  Antibody Screen: negative  Hemoglobin, Hematocrit, Platelets: Hgb 42.2/UKY 34.2/Plt 283  Rubella: immune  T. Pallidum, IgG: non-reactive  Hepatitis B Surface Antigen: non-reactive   Hepatitis C Antibody: non-reactive   HIV: non-reactive   Sickle Cell Screen: negative  Gonorrhea: negative  Chlamydia: negative  Urine culture: negative, date: 22   Early 1 hour Glucose Tolerance Test: 83  1 hour Glucose Tolerance Test: not done   Group B Strep: not done   Cystic Fibrosis Screen: Positive for AR carrier  First Trimester Screen: not done  MSAFP/Multiple Markers: negative for NTD  Non-Invasive Prenatal Testing: low risk  Anatomy US: not yet completed    Tobacco: does smoke, 0.25 packs per day; Alcohol: previous alcohol use; Drug use: current drug use of THC.  Steroids Yes x1 dose at 1443 on 22  (incomplete)  Pregnancy complications: chronic HTN, pre-eclampsia, eclampsia,  labor, drug use, tobacco use. Maternal antibiotics: Not currently.  complications: none. Other medications: Procardia, Hydralazine, Lasix    Discussed:   1. Survival rates  2. Fetal growth & development associated with 27 weeks gestation  3.  Respiratory related complications:  RDS-etiology & treatment including surfactant administration & modes of ventilation & oxygen administration   Apnea of prematurity  4. Intraventricular Hemorrhage  5. Infection  6. Hyperbilirubinemia  7. ROP  8. Feeding & Growth issues: Mother's plan-discussed breast feeding and its benefits for the baby  Immaturity of suck-swallow-breath coordination   Indications for parenteral vs. enteral nutrition  Indications for fortification & supplementation of feeding  Monitoring growth  9. Thermoregulation issues  10. Anemia & transfusions  11. Prolonged NICU stay    Parent(s) given opportunity to ask questions. Verbalized understanding of premature birth and the associated  complications.      Time spent 55 min, > 50% spent in face to face counseling of the family    Electronically signed by: Concha Palacios MD 2022 7:28 PM

## 2022-06-18 NOTE — PROGRESS NOTES
Ob/Gyn Resident Progress Note:     Imaging reviewed with results as follows: No PE, mild pulmonary edema with trace BL pleural effusions, possible 2.9 cm thyroid nodule, mildly enlarged bilateral axillary lymph nodes, probably reactive, solid R pulmonary nodule measuring 5 mm, mild dilatation of the main pulmonary artery which can be seen in pulm HTN     Contacted cardiology requesting recommendations. Labs also reviewed. P/C ratio 39. Patient meets criteria for cHTN w/ HARJINDER w/ SF. Will give a 4g magnesium bolus followed by 1g/hr due to Cr 1.15. Consider delivery for worsening maternal status. CS consent obtained. Echocardiogram ordered by cardiology. Will repeat CBC, CMP and Troponin. Patient's blood pressures are improved but remain severe. Will continue to treat with IV anti-hypertensives and increase oral medications as needed.     Graciela Armstrong DO  OB/GYN Resident, PGY2  [facility]  6/18/2022 7:05 PM

## 2022-06-18 NOTE — FLOWSHEET NOTE
Dr. Tamara Solitario at bedside after RN called for her to come and evaluate, pt c/o harder to breathe.  o2 sat 100%

## 2022-06-19 PROBLEM — E04.1 THYROID NODULE: Status: ACTIVE | Noted: 2022-06-19

## 2022-06-19 PROBLEM — O14.10 PREECLAMPSIA, SEVERE: Status: ACTIVE | Noted: 2022-06-19

## 2022-06-19 PROBLEM — O09.93 HIGH-RISK PREGNANCY IN THIRD TRIMESTER: Status: ACTIVE | Noted: 2022-06-19

## 2022-06-19 PROBLEM — I27.20 PULMONARY HYPERTENSION (HCC): Status: ACTIVE | Noted: 2022-06-19

## 2022-06-19 PROBLEM — J81.1 PULMONARY EDEMA: Status: ACTIVE | Noted: 2022-06-19

## 2022-06-19 PROBLEM — R91.1 LUNG NODULE: Status: ACTIVE | Noted: 2022-06-19

## 2022-06-19 LAB
ABSOLUTE EOS #: <0.03 K/UL (ref 0–0.44)
ABSOLUTE IMMATURE GRANULOCYTE: 0.1 K/UL (ref 0–0.3)
ABSOLUTE LYMPH #: 1.54 K/UL (ref 1.1–3.7)
ABSOLUTE MONO #: 1.05 K/UL (ref 0.1–1.2)
ALBUMIN SERPL-MCNC: 2.9 G/DL (ref 3.5–5.2)
ALBUMIN/GLOBULIN RATIO: 0.7 (ref 1–2.5)
ALBUMIN/GLOBULIN RATIO: 0.8 (ref 1–2.5)
ALBUMIN/GLOBULIN RATIO: 0.8 (ref 1–2.5)
ALP BLD-CCNC: 103 U/L (ref 35–104)
ALP BLD-CCNC: 104 U/L (ref 35–104)
ALP BLD-CCNC: 105 U/L (ref 35–104)
ALT SERPL-CCNC: 32 U/L (ref 5–33)
ALT SERPL-CCNC: 36 U/L (ref 5–33)
ALT SERPL-CCNC: 38 U/L (ref 5–33)
ANION GAP SERPL CALCULATED.3IONS-SCNC: 12 MMOL/L (ref 9–17)
ANION GAP SERPL CALCULATED.3IONS-SCNC: 14 MMOL/L (ref 9–17)
ANION GAP SERPL CALCULATED.3IONS-SCNC: 16 MMOL/L (ref 9–17)
AST SERPL-CCNC: 22 U/L
AST SERPL-CCNC: 23 U/L
AST SERPL-CCNC: 23 U/L
BASOPHILS # BLD: 0 % (ref 0–2)
BASOPHILS ABSOLUTE: 0.03 K/UL (ref 0–0.2)
BILIRUB SERPL-MCNC: 0.42 MG/DL (ref 0.3–1.2)
BILIRUB SERPL-MCNC: 0.48 MG/DL (ref 0.3–1.2)
BILIRUB SERPL-MCNC: 0.5 MG/DL (ref 0.3–1.2)
BUN BLDV-MCNC: 12 MG/DL (ref 6–20)
BUN BLDV-MCNC: 13 MG/DL (ref 6–20)
BUN BLDV-MCNC: 14 MG/DL (ref 6–20)
CALCIUM SERPL-MCNC: 8.1 MG/DL (ref 8.6–10.4)
CALCIUM SERPL-MCNC: 8.5 MG/DL (ref 8.6–10.4)
CALCIUM SERPL-MCNC: 8.5 MG/DL (ref 8.6–10.4)
CHLORIDE BLD-SCNC: 100 MMOL/L (ref 98–107)
CHLORIDE BLD-SCNC: 100 MMOL/L (ref 98–107)
CHLORIDE BLD-SCNC: 99 MMOL/L (ref 98–107)
CO2: 16 MMOL/L (ref 20–31)
CO2: 17 MMOL/L (ref 20–31)
CO2: 18 MMOL/L (ref 20–31)
CREAT SERPL-MCNC: 1.23 MG/DL (ref 0.5–0.9)
CREAT SERPL-MCNC: 1.25 MG/DL (ref 0.5–0.9)
CREAT SERPL-MCNC: 1.29 MG/DL (ref 0.5–0.9)
EOSINOPHILS RELATIVE PERCENT: 0 % (ref 1–4)
GFR AFRICAN AMERICAN: 56 ML/MIN
GFR AFRICAN AMERICAN: 58 ML/MIN
GFR AFRICAN AMERICAN: 59 ML/MIN
GFR NON-AFRICAN AMERICAN: 46 ML/MIN
GFR NON-AFRICAN AMERICAN: 48 ML/MIN
GFR NON-AFRICAN AMERICAN: 49 ML/MIN
GFR SERPL CREATININE-BSD FRML MDRD: ABNORMAL ML/MIN/{1.73_M2}
GLUCOSE BLD-MCNC: 126 MG/DL (ref 70–99)
GLUCOSE BLD-MCNC: 133 MG/DL (ref 70–99)
GLUCOSE BLD-MCNC: 136 MG/DL (ref 70–99)
HCT VFR BLD CALC: 34.9 % (ref 36.3–47.1)
HCT VFR BLD CALC: 36.1 % (ref 36.3–47.1)
HCT VFR BLD CALC: 37.5 % (ref 36.3–47.1)
HEMOGLOBIN: 11.7 G/DL (ref 11.9–15.1)
HEMOGLOBIN: 11.8 G/DL (ref 11.9–15.1)
HEMOGLOBIN: 12.2 G/DL (ref 11.9–15.1)
IMMATURE GRANULOCYTES: 1 %
LYMPHOCYTES # BLD: 10 % (ref 24–43)
MAGNESIUM: 6.3 MG/DL (ref 1.6–2.6)
MAGNESIUM: 6.9 MG/DL (ref 1.6–2.6)
MAGNESIUM: 6.9 MG/DL (ref 1.6–2.6)
MAGNESIUM: 7 MG/DL (ref 1.6–2.6)
MAGNESIUM: 7.2 MG/DL (ref 1.6–2.6)
MCH RBC QN AUTO: 31.6 PG (ref 25.2–33.5)
MCH RBC QN AUTO: 31.7 PG (ref 25.2–33.5)
MCH RBC QN AUTO: 32.2 PG (ref 25.2–33.5)
MCHC RBC AUTO-ENTMCNC: 32.4 G/DL (ref 28.4–34.8)
MCHC RBC AUTO-ENTMCNC: 32.5 G/DL (ref 28.4–34.8)
MCHC RBC AUTO-ENTMCNC: 33.8 G/DL (ref 28.4–34.8)
MCV RBC AUTO: 93.6 FL (ref 82.6–102.9)
MCV RBC AUTO: 97.8 FL (ref 82.6–102.9)
MCV RBC AUTO: 98.9 FL (ref 82.6–102.9)
MONOCYTES # BLD: 7 % (ref 3–12)
MRSA, DNA, NASAL: NEGATIVE
NRBC AUTOMATED: 0 PER 100 WBC
PDW BLD-RTO: 14.9 % (ref 11.8–14.4)
PLATELET # BLD: 238 K/UL (ref 138–453)
PLATELET # BLD: 259 K/UL (ref 138–453)
PLATELET # BLD: 260 K/UL (ref 138–453)
PMV BLD AUTO: 10 FL (ref 8.1–13.5)
PMV BLD AUTO: 10.1 FL (ref 8.1–13.5)
PMV BLD AUTO: 9.6 FL (ref 8.1–13.5)
POTASSIUM SERPL-SCNC: 3.8 MMOL/L (ref 3.7–5.3)
POTASSIUM SERPL-SCNC: 3.9 MMOL/L (ref 3.7–5.3)
POTASSIUM SERPL-SCNC: 4.1 MMOL/L (ref 3.7–5.3)
PRO-BNP: 1356 PG/ML
PRO-BNP: 1649 PG/ML
PRO-BNP: 1904 PG/ML
RBC # BLD: 3.69 M/UL (ref 3.95–5.11)
RBC # BLD: 3.73 M/UL (ref 3.95–5.11)
RBC # BLD: 3.79 M/UL (ref 3.95–5.11)
RBC # BLD: ABNORMAL 10*6/UL
SEG NEUTROPHILS: 83 % (ref 36–65)
SEGMENTED NEUTROPHILS ABSOLUTE COUNT: 12.88 K/UL (ref 1.5–8.1)
SODIUM BLD-SCNC: 128 MMOL/L (ref 135–144)
SODIUM BLD-SCNC: 132 MMOL/L (ref 135–144)
SODIUM BLD-SCNC: 132 MMOL/L (ref 135–144)
SPECIMEN DESCRIPTION: NORMAL
TOTAL PROTEIN: 6.6 G/DL (ref 6.4–8.3)
TOTAL PROTEIN: 6.7 G/DL (ref 6.4–8.3)
TOTAL PROTEIN: 6.8 G/DL (ref 6.4–8.3)
TROPONIN, HIGH SENSITIVITY: 21 NG/L (ref 0–14)
TROPONIN, HIGH SENSITIVITY: 22 NG/L (ref 0–14)
TROPONIN, HIGH SENSITIVITY: 25 NG/L (ref 0–14)
WBC # BLD: 15.6 K/UL (ref 3.5–11.3)
WBC # BLD: 19.4 K/UL (ref 3.5–11.3)
WBC # BLD: 19.7 K/UL (ref 3.5–11.3)

## 2022-06-19 PROCEDURE — 94761 N-INVAS EAR/PLS OXIMETRY MLT: CPT

## 2022-06-19 PROCEDURE — 2500000003 HC RX 250 WO HCPCS: Performed by: STUDENT IN AN ORGANIZED HEALTH CARE EDUCATION/TRAINING PROGRAM

## 2022-06-19 PROCEDURE — 85027 COMPLETE CBC AUTOMATED: CPT

## 2022-06-19 PROCEDURE — 6370000000 HC RX 637 (ALT 250 FOR IP): Performed by: STUDENT IN AN ORGANIZED HEALTH CARE EDUCATION/TRAINING PROGRAM

## 2022-06-19 PROCEDURE — 2580000003 HC RX 258: Performed by: STUDENT IN AN ORGANIZED HEALTH CARE EDUCATION/TRAINING PROGRAM

## 2022-06-19 PROCEDURE — 6360000002 HC RX W HCPCS: Performed by: STUDENT IN AN ORGANIZED HEALTH CARE EDUCATION/TRAINING PROGRAM

## 2022-06-19 PROCEDURE — 85025 COMPLETE CBC W/AUTO DIFF WBC: CPT

## 2022-06-19 PROCEDURE — 99291 CRITICAL CARE FIRST HOUR: CPT | Performed by: INTERNAL MEDICINE

## 2022-06-19 PROCEDURE — 99024 POSTOP FOLLOW-UP VISIT: CPT | Performed by: OBSTETRICS & GYNECOLOGY

## 2022-06-19 PROCEDURE — 96374 THER/PROPH/DIAG INJ IV PUSH: CPT

## 2022-06-19 PROCEDURE — 96375 TX/PRO/DX INJ NEW DRUG ADDON: CPT

## 2022-06-19 PROCEDURE — 83735 ASSAY OF MAGNESIUM: CPT

## 2022-06-19 PROCEDURE — 36415 COLL VENOUS BLD VENIPUNCTURE: CPT

## 2022-06-19 PROCEDURE — 80053 COMPREHEN METABOLIC PANEL: CPT

## 2022-06-19 PROCEDURE — 96372 THER/PROPH/DIAG INJ SC/IM: CPT

## 2022-06-19 PROCEDURE — 2500000003 HC RX 250 WO HCPCS

## 2022-06-19 PROCEDURE — 1220000000 HC SEMI PRIVATE OB R&B

## 2022-06-19 PROCEDURE — 83880 ASSAY OF NATRIURETIC PEPTIDE: CPT

## 2022-06-19 PROCEDURE — 87641 MR-STAPH DNA AMP PROBE: CPT

## 2022-06-19 PROCEDURE — 96376 TX/PRO/DX INJ SAME DRUG ADON: CPT

## 2022-06-19 PROCEDURE — 84484 ASSAY OF TROPONIN QUANT: CPT

## 2022-06-19 RX ORDER — ACETAMINOPHEN 500 MG
1000 TABLET ORAL EVERY 8 HOURS
Status: DISCONTINUED | OUTPATIENT
Start: 2022-06-19 | End: 2022-06-22 | Stop reason: HOSPADM

## 2022-06-19 RX ORDER — OXYCODONE HYDROCHLORIDE 5 MG/1
5 TABLET ORAL EVERY 4 HOURS PRN
Status: DISCONTINUED | OUTPATIENT
Start: 2022-06-19 | End: 2022-06-22 | Stop reason: HOSPADM

## 2022-06-19 RX ORDER — 0.9 % SODIUM CHLORIDE 0.9 %
500 INTRAVENOUS SOLUTION INTRAVENOUS ONCE
Status: DISCONTINUED | OUTPATIENT
Start: 2022-06-19 | End: 2022-06-22 | Stop reason: HOSPADM

## 2022-06-19 RX ORDER — SODIUM CHLORIDE 9 MG/ML
INJECTION, SOLUTION INTRAVENOUS PRN
Status: DISCONTINUED | OUTPATIENT
Start: 2022-06-19 | End: 2022-06-22 | Stop reason: HOSPADM

## 2022-06-19 RX ORDER — NALOXONE HYDROCHLORIDE 0.4 MG/ML
0.4 INJECTION, SOLUTION INTRAMUSCULAR; INTRAVENOUS; SUBCUTANEOUS PRN
Status: DISCONTINUED | OUTPATIENT
Start: 2022-06-19 | End: 2022-06-22 | Stop reason: HOSPADM

## 2022-06-19 RX ORDER — SODIUM CHLORIDE 0.9 % (FLUSH) 0.9 %
5-40 SYRINGE (ML) INJECTION EVERY 12 HOURS SCHEDULED
Status: DISCONTINUED | OUTPATIENT
Start: 2022-06-19 | End: 2022-06-22 | Stop reason: HOSPADM

## 2022-06-19 RX ORDER — HYDRALAZINE HYDROCHLORIDE 50 MG/1
50 TABLET, FILM COATED ORAL EVERY 8 HOURS SCHEDULED
Status: DISCONTINUED | OUTPATIENT
Start: 2022-06-19 | End: 2022-06-19

## 2022-06-19 RX ORDER — HYDROCHLOROTHIAZIDE 25 MG/1
25 TABLET ORAL EVERY 24 HOURS
Status: DISCONTINUED | OUTPATIENT
Start: 2022-06-19 | End: 2022-06-19

## 2022-06-19 RX ORDER — LANOLIN 72 %
OINTMENT (GRAM) TOPICAL
Status: DISCONTINUED | OUTPATIENT
Start: 2022-06-19 | End: 2022-06-22 | Stop reason: HOSPADM

## 2022-06-19 RX ORDER — CEPHALEXIN 500 MG/1
500 CAPSULE ORAL EVERY 8 HOURS SCHEDULED
Status: COMPLETED | OUTPATIENT
Start: 2022-06-19 | End: 2022-06-21

## 2022-06-19 RX ORDER — NIFEDIPINE 30 MG/1
90 TABLET, EXTENDED RELEASE ORAL DAILY
Status: DISCONTINUED | OUTPATIENT
Start: 2022-06-19 | End: 2022-06-19

## 2022-06-19 RX ORDER — NIFEDIPINE 30 MG/1
30 TABLET, EXTENDED RELEASE ORAL ONCE
Status: DISCONTINUED | OUTPATIENT
Start: 2022-06-19 | End: 2022-06-19

## 2022-06-19 RX ORDER — LABETALOL 200 MG/1
200 TABLET, FILM COATED ORAL 2 TIMES DAILY
Status: DISCONTINUED | OUTPATIENT
Start: 2022-06-19 | End: 2022-06-22 | Stop reason: HOSPADM

## 2022-06-19 RX ORDER — SODIUM CHLORIDE 9 MG/ML
INJECTION, SOLUTION INTRAVENOUS CONTINUOUS
Status: DISCONTINUED | OUTPATIENT
Start: 2022-06-19 | End: 2022-06-19

## 2022-06-19 RX ORDER — SIMETHICONE 80 MG
80 TABLET,CHEWABLE ORAL EVERY 6 HOURS PRN
Status: DISCONTINUED | OUTPATIENT
Start: 2022-06-19 | End: 2022-06-22 | Stop reason: HOSPADM

## 2022-06-19 RX ORDER — NIFEDIPINE 30 MG/1
30 TABLET, EXTENDED RELEASE ORAL ONCE
Status: COMPLETED | OUTPATIENT
Start: 2022-06-19 | End: 2022-06-19

## 2022-06-19 RX ORDER — LABETALOL HYDROCHLORIDE 5 MG/ML
20 INJECTION, SOLUTION INTRAVENOUS ONCE
Status: COMPLETED | OUTPATIENT
Start: 2022-06-19 | End: 2022-06-19

## 2022-06-19 RX ORDER — DOCUSATE SODIUM 100 MG/1
100 CAPSULE, LIQUID FILLED ORAL 2 TIMES DAILY PRN
Qty: 60 CAPSULE | Refills: 1 | Status: SHIPPED | OUTPATIENT
Start: 2022-06-19 | End: 2022-07-19

## 2022-06-19 RX ORDER — LABETALOL 100 MG/1
50 TABLET, FILM COATED ORAL EVERY 12 HOURS SCHEDULED
Status: DISCONTINUED | OUTPATIENT
Start: 2022-06-19 | End: 2022-06-19

## 2022-06-19 RX ORDER — HYDROCHLOROTHIAZIDE 25 MG/1
25 TABLET ORAL DAILY
Status: DISCONTINUED | OUTPATIENT
Start: 2022-06-20 | End: 2022-06-22 | Stop reason: HOSPADM

## 2022-06-19 RX ORDER — CLINDAMYCIN PHOSPHATE 10 UG/ML
LOTION TOPICAL 2 TIMES DAILY
Status: DISCONTINUED | OUTPATIENT
Start: 2022-06-19 | End: 2022-06-22 | Stop reason: HOSPADM

## 2022-06-19 RX ORDER — LABETALOL HYDROCHLORIDE 5 MG/ML
INJECTION, SOLUTION INTRAVENOUS
Status: COMPLETED
Start: 2022-06-19 | End: 2022-06-19

## 2022-06-19 RX ORDER — ACETAMINOPHEN 500 MG
1000 TABLET ORAL EVERY 6 HOURS PRN
Qty: 60 TABLET | Refills: 1 | Status: SHIPPED | OUTPATIENT
Start: 2022-06-19

## 2022-06-19 RX ORDER — KETOROLAC TROMETHAMINE 30 MG/ML
30 INJECTION, SOLUTION INTRAMUSCULAR; INTRAVENOUS EVERY 6 HOURS
Status: DISCONTINUED | OUTPATIENT
Start: 2022-06-19 | End: 2022-06-19

## 2022-06-19 RX ORDER — LABETALOL 100 MG/1
100 TABLET, FILM COATED ORAL EVERY 12 HOURS SCHEDULED
Status: DISCONTINUED | OUTPATIENT
Start: 2022-06-19 | End: 2022-06-19

## 2022-06-19 RX ORDER — ONDANSETRON 2 MG/ML
4 INJECTION INTRAMUSCULAR; INTRAVENOUS EVERY 6 HOURS PRN
Status: DISCONTINUED | OUTPATIENT
Start: 2022-06-19 | End: 2022-06-22 | Stop reason: HOSPADM

## 2022-06-19 RX ORDER — POLYETHYLENE GLYCOL 3350 17 G/17G
17 POWDER, FOR SOLUTION ORAL DAILY PRN
Status: DISCONTINUED | OUTPATIENT
Start: 2022-06-19 | End: 2022-06-22 | Stop reason: HOSPADM

## 2022-06-19 RX ORDER — DOCUSATE SODIUM 100 MG/1
100 CAPSULE, LIQUID FILLED ORAL 2 TIMES DAILY
Status: DISCONTINUED | OUTPATIENT
Start: 2022-06-19 | End: 2022-06-22 | Stop reason: HOSPADM

## 2022-06-19 RX ORDER — NALBUPHINE HCL 10 MG/ML
10 AMPUL (ML) INJECTION ONCE
Status: COMPLETED | OUTPATIENT
Start: 2022-06-19 | End: 2022-06-19

## 2022-06-19 RX ORDER — DIPHENHYDRAMINE HYDROCHLORIDE 50 MG/ML
25 INJECTION INTRAMUSCULAR; INTRAVENOUS EVERY 6 HOURS PRN
Status: DISCONTINUED | OUTPATIENT
Start: 2022-06-19 | End: 2022-06-22 | Stop reason: HOSPADM

## 2022-06-19 RX ORDER — LABETALOL HYDROCHLORIDE 5 MG/ML
10 INJECTION, SOLUTION INTRAVENOUS EVERY 4 HOURS PRN
Status: DISCONTINUED | OUTPATIENT
Start: 2022-06-19 | End: 2022-06-19

## 2022-06-19 RX ORDER — NIFEDIPINE 30 MG/1
60 TABLET, EXTENDED RELEASE ORAL DAILY
Status: DISCONTINUED | OUTPATIENT
Start: 2022-06-19 | End: 2022-06-19

## 2022-06-19 RX ORDER — BISACODYL 10 MG
10 SUPPOSITORY, RECTAL RECTAL DAILY PRN
Status: DISCONTINUED | OUTPATIENT
Start: 2022-06-19 | End: 2022-06-22 | Stop reason: HOSPADM

## 2022-06-19 RX ORDER — LABETALOL HYDROCHLORIDE 5 MG/ML
40 INJECTION, SOLUTION INTRAVENOUS ONCE
Status: COMPLETED | OUTPATIENT
Start: 2022-06-19 | End: 2022-06-19

## 2022-06-19 RX ORDER — HYDRALAZINE HYDROCHLORIDE 20 MG/ML
10 INJECTION INTRAMUSCULAR; INTRAVENOUS ONCE
Status: COMPLETED | OUTPATIENT
Start: 2022-06-19 | End: 2022-06-19

## 2022-06-19 RX ORDER — OXYCODONE HYDROCHLORIDE 5 MG/1
5 TABLET ORAL EVERY 6 HOURS PRN
Qty: 20 TABLET | Refills: 0 | Status: SHIPPED | OUTPATIENT
Start: 2022-06-19 | End: 2022-06-24

## 2022-06-19 RX ORDER — METRONIDAZOLE 500 MG/1
500 TABLET ORAL EVERY 8 HOURS SCHEDULED
Status: COMPLETED | OUTPATIENT
Start: 2022-06-19 | End: 2022-06-21

## 2022-06-19 RX ORDER — SODIUM CHLORIDE 0.9 % (FLUSH) 0.9 %
5-40 SYRINGE (ML) INJECTION PRN
Status: DISCONTINUED | OUTPATIENT
Start: 2022-06-19 | End: 2022-06-22 | Stop reason: HOSPADM

## 2022-06-19 RX ORDER — LABETALOL HYDROCHLORIDE 5 MG/ML
80 INJECTION, SOLUTION INTRAVENOUS ONCE
Status: COMPLETED | OUTPATIENT
Start: 2022-06-19 | End: 2022-06-19

## 2022-06-19 RX ORDER — CLINDAMYCIN PHOSPHATE 10 MG/G
GEL TOPICAL
Qty: 60 G | Refills: 1 | Status: SHIPPED | OUTPATIENT
Start: 2022-06-19 | End: 2022-06-26

## 2022-06-19 RX ORDER — NIFEDIPINE 30 MG/1
120 TABLET, EXTENDED RELEASE ORAL DAILY
Status: DISCONTINUED | OUTPATIENT
Start: 2022-06-20 | End: 2022-06-22 | Stop reason: HOSPADM

## 2022-06-19 RX ORDER — VITAMIN A, ASCORBIC ACID, CHOLECALCIFEROL, .ALPHA.-TOCOPHEROL ACETATE, DL-, THIAMINE MONONITRATE, RIBOFLAVIN, NIACINAMIDE, PYRIDOXINE HYDROCHLORIDE, FOLIC ACID, CYANOCOBALAMIN, CALCIUM CARBONATE, IRON, ZINC OXIDE, AND CUPRIC OXIDE 4000; 120; 400; 22; 1.84; 3; 20; 10; 1; 12; 200; 29; 25; 2 [IU]/1; MG/1; [IU]/1; [IU]/1; MG/1; MG/1; MG/1; MG/1; MG/1; UG/1; MG/1; MG/1; MG/1; MG/1
1 TABLET ORAL DAILY
Status: DISCONTINUED | OUTPATIENT
Start: 2022-06-19 | End: 2022-06-22 | Stop reason: HOSPADM

## 2022-06-19 RX ORDER — HEPARIN SODIUM 5000 [USP'U]/ML
7500 INJECTION, SOLUTION INTRAVENOUS; SUBCUTANEOUS EVERY 12 HOURS
Status: DISCONTINUED | OUTPATIENT
Start: 2022-06-19 | End: 2022-06-22 | Stop reason: HOSPADM

## 2022-06-19 RX ORDER — OXYCODONE HYDROCHLORIDE 5 MG/1
10 TABLET ORAL EVERY 4 HOURS PRN
Status: DISCONTINUED | OUTPATIENT
Start: 2022-06-19 | End: 2022-06-22 | Stop reason: HOSPADM

## 2022-06-19 RX ADMIN — CEPHALEXIN 500 MG: 500 CAPSULE ORAL at 18:15

## 2022-06-19 RX ADMIN — SODIUM CHLORIDE, PRESERVATIVE FREE 10 ML: 5 INJECTION INTRAVENOUS at 08:18

## 2022-06-19 RX ADMIN — NIFEDIPINE 90 MG: 30 TABLET, FILM COATED, EXTENDED RELEASE ORAL at 09:34

## 2022-06-19 RX ADMIN — HEPARIN SODIUM 7500 UNITS: 5000 INJECTION INTRAVENOUS; SUBCUTANEOUS at 14:00

## 2022-06-19 RX ADMIN — LABETALOL HYDROCHLORIDE 40 MG: 5 INJECTION, SOLUTION INTRAVENOUS at 02:42

## 2022-06-19 RX ADMIN — LABETALOL HYDROCHLORIDE 200 MG: 200 TABLET, FILM COATED ORAL at 18:19

## 2022-06-19 RX ADMIN — CLINDAMYCIN PHOSPHATE: 10 LOTION TOPICAL at 12:49

## 2022-06-19 RX ADMIN — Medication 40 MG: at 02:42

## 2022-06-19 RX ADMIN — OXYCODONE 10 MG: 5 TABLET ORAL at 23:59

## 2022-06-19 RX ADMIN — HYDRALAZINE HYDROCHLORIDE 10 MG: 20 INJECTION INTRAMUSCULAR; INTRAVENOUS at 11:33

## 2022-06-19 RX ADMIN — ACETAMINOPHEN 1000 MG: 500 TABLET ORAL at 17:13

## 2022-06-19 RX ADMIN — HYDROMORPHONE HYDROCHLORIDE 0.5 MG: 1 INJECTION, SOLUTION INTRAMUSCULAR; INTRAVENOUS; SUBCUTANEOUS at 06:04

## 2022-06-19 RX ADMIN — KETOROLAC TROMETHAMINE 30 MG: 30 INJECTION, SOLUTION INTRAMUSCULAR at 01:13

## 2022-06-19 RX ADMIN — LABETALOL HYDROCHLORIDE 80 MG: 5 INJECTION, SOLUTION INTRAVENOUS at 04:00

## 2022-06-19 RX ADMIN — DOCUSATE SODIUM 100 MG: 100 CAPSULE ORAL at 09:36

## 2022-06-19 RX ADMIN — LABETALOL HYDROCHLORIDE 20 MG: 5 INJECTION, SOLUTION INTRAVENOUS at 12:41

## 2022-06-19 RX ADMIN — DIPHENHYDRAMINE HYDROCHLORIDE 25 MG: 50 INJECTION, SOLUTION INTRAMUSCULAR; INTRAVENOUS at 01:13

## 2022-06-19 RX ADMIN — ONDANSETRON 4 MG: 2 INJECTION, SOLUTION INTRAMUSCULAR; INTRAVENOUS at 09:38

## 2022-06-19 RX ADMIN — MAGNESIUM SULFATE HEPTAHYDRATE 2000 MG/HR: 40 INJECTION, SOLUTION INTRAVENOUS at 06:02

## 2022-06-19 RX ADMIN — Medication 1 TABLET: at 09:34

## 2022-06-19 RX ADMIN — Medication 20 MG: at 12:41

## 2022-06-19 RX ADMIN — HYDROCHLOROTHIAZIDE 25 MG: 25 TABLET ORAL at 04:00

## 2022-06-19 RX ADMIN — MAGNESIUM SULFATE HEPTAHYDRATE 1000 MG/HR: 40 INJECTION, SOLUTION INTRAVENOUS at 19:14

## 2022-06-19 RX ADMIN — Medication 20 MG: at 01:49

## 2022-06-19 RX ADMIN — NALBUPHINE HYDROCHLORIDE 10 MG: 10 INJECTION, SOLUTION INTRAMUSCULAR; INTRAVENOUS; SUBCUTANEOUS at 03:36

## 2022-06-19 RX ADMIN — METRONIDAZOLE 500 MG: 500 TABLET, FILM COATED ORAL at 18:15

## 2022-06-19 RX ADMIN — LABETALOL HYDROCHLORIDE 50 MG: 100 TABLET, FILM COATED ORAL at 11:03

## 2022-06-19 RX ADMIN — DIPHENHYDRAMINE HYDROCHLORIDE 25 MG: 50 INJECTION, SOLUTION INTRAMUSCULAR; INTRAVENOUS at 18:17

## 2022-06-19 RX ADMIN — NIFEDIPINE 30 MG: 30 TABLET, FILM COATED, EXTENDED RELEASE ORAL at 12:42

## 2022-06-19 ASSESSMENT — PAIN SCALES - GENERAL
PAINLEVEL_OUTOF10: 0
PAINLEVEL_OUTOF10: 0
PAINLEVEL_OUTOF10: 8
PAINLEVEL_OUTOF10: 0
PAINLEVEL_OUTOF10: 0

## 2022-06-19 ASSESSMENT — PAIN DESCRIPTION - LOCATION: LOCATION: ABDOMEN

## 2022-06-19 ASSESSMENT — PAIN DESCRIPTION - ORIENTATION: ORIENTATION: LOWER;MID

## 2022-06-19 ASSESSMENT — PAIN DESCRIPTION - DESCRIPTORS: DESCRIPTORS: ACHING;BURNING;JABBING

## 2022-06-19 NOTE — ANESTHESIA PROCEDURE NOTES
Spinal Block    Patient location during procedure: OR  End time: 6/18/2022 11:22 PM  Reason for block: primary anesthetic and at surgeon's request  Staffing  Performed: resident/CRNA   Anesthesiologist: Cricket Harper MD  Resident/CRNA: GROVER José CRNA  Spinal Block  Patient position: sitting  Prep: Betadine  Patient monitoring: frequent blood pressure checks and continuous pulse ox  Approach: midline  Location: L3/L4  Provider prep: sterile gloves and mask  Local infiltration: lidocaine  Needle  Needle type: Pencan   Needle gauge: 22 G  Needle length: 4.75 in  Assessment  Sensory level: T4  Events: cerebrospinal fluid  Swirl obtained: Yes  CSF: clear  Attempts: 2  Hemodynamics: stable  Preanesthetic Checklist  Completed: patient identified, IV checked, site marked, risks and benefits discussed, surgical/procedural consents, equipment checked, pre-op evaluation, timeout performed, anesthesia consent given, oxygen available and monitors applied/VS acknowledged

## 2022-06-19 NOTE — H&P
Critical Care - History and Physical Examination    Patient's name:  Luna Truong  Medical Record Number: [id number]  Patient's account/billing number: [de-identified]  Patient's YOB: 1985  Age: 42 y.o. Date of Admission: 2022 12:50 PM  Reason of ICU admission:   Date of History and Physical Examination: 2022      Primary Care Physician: GROVER Schaffer CNP  Attending Physician:    Code Status: Full Code      Reason for ICU admission: Hypertensive urgency      History Of Present Illness:   History was obtained from chart review. Luna Truong is a 42 y.o. with past medical history of smoking, cannabinoid use, chronic hypertension for which she does not take any medications at home, history of  section as well as preeclampsia who is postop day 1 from  section which was done urgently with concerns for superimposed preeclampsia due to worsening maternal status. Patient has pulmonary hypertension, pulmonary edema, elevated troponin as well as elevated BNP. Patient is being transferred medical ICU with concerns for blood pressure management, has received cocktail of medications with uncontrolled blood pressure,  cardiology has been consulted, is currently following, critical care is consulted with concerns for potential instability. Patient most recent magnesium 2.8, BUN/creatinine , patient does appear to have an acute change in white blood cell count with patient's WBC at 15.6 from 10.3, patient does have an elevated urine creatinine, urine total protein with concerns for preeclampsia as well as slightly elevated liver enzymes. Patient denies headache, visual changes, abdominal pain, denies shortness of breath, chest pain, denies change in her extremities, worsening swelling.   Patient is currently receiving magnesium sulfate treatment 2 g/h to be shut off at 2350 on 2022          Past Medical History:        Diagnosis Date    Chronic hypertension (meds) 2022    Taking labetalol 200 mg TID    History of  x2 2022    History of pre-eclampsia x2 2022    Hypertension        Past Surgical History:        Procedure Laterality Date     SECTION      x2       Allergies: Allergies   Allergen Reactions    Asa [Aspirin]     Bee Pollen     Dust Mite Extract     Flour     Potassium-Containing Compounds     Shellfish-Derived Products     Strawberry Extract          Home Medications:   Prior to Admission medications    Medication Sig Start Date End Date Taking? Authorizing Provider   calcium carbonate (OS-BAR) 1250 (500 Ca) MG chewable tablet Take 200 mg by mouth daily 3/23/22  Yes Historical Provider, MD   cetirizine (ZYRTEC) 10 MG tablet Take 10 mg by mouth nightly 3/23/22  Yes Historical Provider, MD   famotidine (PEPCID) 20 MG tablet Take 20 mg by mouth 2 times daily 3/24/22  Yes Historical Provider, MD   pyridoxine (B-6) 50 MG tablet Take 25 mg by mouth daily 3/23/22  Yes Historical Provider, MD   diphenhydrAMINE (BENADRYL) 25 MG capsule Take 25 mg by mouth every 6 hours as needed    Historical Provider, MD   acetaminophen (TYLENOL) 500 MG tablet Take 2 tablets by mouth every 6 hours as needed for Pain 21   Matthew Moe MD   lidocaine (LIDODERM) 5 % Place 1 patch onto the skin daily 12 hours on, 12 hours off. 21   Matthew Moe MD       Social History:   TOBACCO:   reports that she has been smoking cigarettes. She has been smoking about 0.25 packs per day. She has never used smokeless tobacco.  ETOH:   reports previous alcohol use. DRUGS:  reports current drug use. Drug: Marijuana Myrtis Regulus). OCCUPATION:      Family History:   No family history on file.         REVIEW OF SYSTEMS (ROS):  Review of Systems - Negative except mentioned in HPI   General ROS: negative  Psychological ROS: negative  Ophthalmic ROS: negative  ENT: negative  Hematological and Lymphatic ROS: negative  Endocrine ROS: negative  Breast ROS: negative  Respiratory ROS: no cough, shortness of breath, or wheezing  Cardiovascular ROS: no chest pain or dyspnea on exertion  Gastrointestinal ROS:negative  Genito-Urinary ROS: negative  Musculoskeletal ROS: negative  Neurological ROS: negative  Dermatological ROS: negative      Physical Exam:    Vitals: BP (!) 155/79   Pulse 61   Temp 98.2 °F (36.8 °C)   Resp 16   SpO2 97%   Breastfeeding Unknown     Body weight:   Wt Readings from Last 3 Encounters:   04/18/22 265 lb (120.2 kg)   04/17/21 280 lb (127 kg)       Body Mass Index : There is no height or weight on file to calculate BMI. PHYSICAL EXAMINATION :   Constitutional: Appears well, in no distress  EENT: PERRLA, EOMI, sclera clear, anicteric, oropharynx clear, no lesions, neck supple with midline trachea. Neck: Supple, symmetrical, trachea midline, no adenopathy, thyroid symmetric, no jvd skin normal  Respiratory: clear to auscultation, no wheezes or rales and unlabored breathing. No intercostal tenderness  Cardiovascular: regular rate and rhythm, normal S1, S2, no murmur noted and 2+ pulses throughout  Abdomen: soft, C sec changes, wound vac in place  Neurological: alert, oriented  Extremities:  peripheral pulses normal, no pedal edema, no clubbing or cyanosis      Laboratory findings:-    CBC:   Recent Labs     06/18/22 1951   WBC 10.3   HGB 12.4        BMP:    Recent Labs     06/18/22  1357 06/18/22  1357 06/18/22 1951      < > 133*   K 3.7   < > 3.8      < > 102   CO2 20   < > 15*   BUN 11   < > 11   CREATININE 1.15*  --  1.00*   GLUCOSE 74   < > 84    < > = values in this interval not displayed. S. Calcium:  Recent Labs     06/18/22 1951   CALCIUM 8.6     S. Ionized Calcium:No results for input(s): IONCA in the last 72 hours. S. Magnesium:  Recent Labs     06/18/22 1951   MG 2.8*     S. Phosphorus:No results for input(s): PHOS in the last 72 hours.   S. Glucose:No results for input(s): POCGLU in the last 72 hours. Glycosylated hemoglobin A1C: No results for input(s): LABA1C in the last 72 hours. INR: No results for input(s): INR in the last 72 hours. Hepatic functions:   Recent Labs     06/18/22 1951   ALKPHOS 113*   ALT 43*   AST 35*   PROT 7.0   BILITOT 0.89   LABALBU 3.1*     Pancreatic functions:No results for input(s): LACTA, AMYLASE in the last 72 hours. S. Lactic Acid: No results for input(s): LACTA in the last 72 hours. Cardiac enzymes:No results for input(s): CKTOTAL, CKMB, CKMBINDEX, TROPONINI in the last 72 hours. BNP:No results for input(s): BNP in the last 72 hours. Lipid profile: No results for input(s): CHOL, TRIG, HDL, LDL, LDLCALC in the last 72 hours.   Blood Gases: No results found for: PH, PCO2, PO2, HCO3, O2SAT  Thyroid functions:   Lab Results   Component Value Date    TSH 0.22 06/18/2022        Urinalysis:     Microbiology:  Cultures during this admission:     Blood cultures:                 [] None drawn      [] Negative             []  Positive (Details:  )  Urine Culture:                   [] None drawn      [] Negative             []  Positive (Details:  )  Sputum Culture:               [] None drawn       [] Negative             []  Positive (Details:  )   Endotracheal aspirate:     [] None drawn       [] Negative             []  Positive (Details:  )         -----------------------------------------------------------------  Radiological reports:    ECHO Complete 2D W Doppler W Color    Result Date: 6/18/2022  Transthoracic Echocardiography Report (TTE)  Patient Name [address] [place] [address]    Date of Study               06/18/2022               [facility]   Date of      1985  Gender                      Female  Birth   Age          42 year(s)  Race                        Black   Room Number  0701        Height:                     66 inch, 167.64 cm   Corporate ID [id number]    Weight:                     289 pounds, 131.1 kg  #   Patient Acct [de-identified]   BSA:          2.34 m^2      BMI: 46.65  #                                                              kg/m^2   MR #         [id number]     Sonographer                 Fredrick Seo   Accession #  [id number]  Interpreting Physician      Felecia Meraz   Fellow                   Referring Nurse                           Practitioner   Interpreting             Referring Physician         Jose Callahan MD  Fellow  Type of Study   TTE procedure:2D Echocardiogram, M-Mode, Doppler, Color Doppler. Procedure Date Date: 06/18/2022 Start: 06:12 PM Study Location: [facility] Technical Quality: Adequate visualization Indications:Chest pain. History / Tech. Comments: Echo done at patient bedside. Procedure explained to patient. HTN, Pre-eclampsia Patient Status: STAT Height: 66 inches Weight: 289.01 pounds BSA: 2.34 m^2 BMI: 46.65 kg/m^2 HR: 69 bpm CONCLUSIONS Summary Left ventricle is normal in size. Global left ventricular systolic function is normal. Calculated ejection fraction 58% by Barba's method. Mild tricuspid regurgitation. Signature ----------------------------------------------------------------------------  Electronically signed by Rosalind Sánchez(Sonographer) on 06/18/2022  06:46 PM ---------------------------------------------------------------------------- ----------------------------------------------------------------------------  Electronically signed by Felecia Meraz(Interpreting physician) on  06/18/2022 10:38 PM ---------------------------------------------------------------------------- FINDINGS Left Atrium Left atrium is moderately dilated. Left Ventricle Left ventricle is normal in size. Global left ventricular systolic function is normal. Calculated ejection fraction 58% by Barba's method. Right Atrium Right atrium is normal in size. Right Ventricle Normal right ventricular size and function. TAPSE value of 3.16cm noted. Mitral Valve Normal mitral valve structure. Trivial mitral regurgitation.  Aortic Valve Aortic valve is trileaflet and opens adequately. Slightly increased aortic velocities without stenosis. No aortic insufficiency. Tricuspid Valve No obvious valvular abnormality. Mild tricuspid regurgitation. Mild pulmonary hypertension. Estimated right ventricular systolic pressure is 37CRGK. Pulmonic Valve The pulmonic valve is normal in structure. Trivial pulmonic insufficiency. Pericardial Effusion No pericardial effusion seen. Miscellaneous E/E' average = 14.5. IVC normal diameter & inspiratory collapse indicating normal RA filling pressure .  M-mode / 2D Measurements & Calculations:   LVIDd:5.3 cm(3.7 - 5.6 cm)       Diastolic JJHCXQ:01.3 ml  ZRHCE:3.1 cm(2.2 - 4.0 cm)       Systolic XCGKCD:78.1 ml  VQNR:1.0 cm(0.6 - 1.1 cm)        LA Dimension: 4.9 cm(1.9 - 4.0 cm)  LVPWd:1.1 cm(0.6 - 1.1 cm)       LA volume/Index: 104 ml /44m^2  Fractional Shortenin.64 %    LVOT:1.8 cm  Calculated LVEF (%): 58.7 %      RVDd:3 cm   Mitral:                                 Aortic   Valve Area (P1/2-Time): 3.73 cm^2       Peak Velocity: 2.56 m/s  Peak E-Wave: 1.25 m/s                   Mean Velocity: 1.56 m/s  Peak A-Wave: 0.50 m/s                   Peak Gradient: 26.21 mmHg  E/A Ratio: 2.49                         Mean Gradient: 12 mmHg  Peak Gradient: 6.25 mmHg  Mean Gradient: 3 mmHg  Deceleration Time: 202 msec             Area (continuity): 1.94 cm^2  P1/2t: 59 msec                          AV VTI: 56.8 cm   Area (continuity): 2.35 cm^2  Mean Velocity: 0.74 m/s   Tricuspid:   Peak TR Velocity: 2.87 m/s  Peak TR Gradient: 32.9476 mmHg  Diastology / Tissue Doppler Septal Wall E' velocity:0.08 m/s Septal Wall E/E':15.3 Lateral Wall E' velocity:0.10 m/s Lateral Wall E/E':12.4    CT HEAD WO CONTRAST    Result Date: 2022  EXAMINATION: CTA OF THE HEAD WITH CONTRAST; CT OF THE HEAD WITHOUT CONTRAST 2022 3:40 pm; 2022 3:23 pm: TECHNIQUE: CTA of the head/brain was performed with the administration of intravenous contrast. radiation dose to as low as reasonably achievable. COMPARISON: None. HISTORY: ORDERING SYSTEM PROVIDED HISTORY: severe hypertension, bradycardia, chest pain, abdominal pain, 27 weeks pregnant, hx CS x2 TECHNOLOGIST PROVIDED HISTORY: severe hypertension, bradycardia, chest pain, abdominal pain, 27 weeks pregnant, hx CS x2 Reason for Exam: htn, bradycardia, chest pain. FINDINGS: Pulmonary Arteries: Pulmonary arteries are adequately opacified for evaluation. No evidence of intraluminal filling defect to suggest pulmonary embolism. Mild dilatation of the main pulmonary artery. Mediastinum: No evidence of mediastinal lymphadenopathy. The heart and pericardium demonstrate no acute abnormality. There is no acute abnormality of the thoracic aorta. Possible 2.9 cm thyroid nodule. Lungs/pleura: Smooth interlobular septal thickening at the lung bases. No trace bilateral pleural effusions. 5 mm solid pulmonary nodule of the right lung apex on series 4, image 18. Upper Abdomen: Small hiatal hernia. Soft Tissues/Bones: No acute bone or soft tissue abnormality. Mildly enlarged bilateral axillary lymph nodes. 1. No evidence of pulmonary embolism. 2. Mild pulmonary edema with trace bilateral pleural effusions. 3. Possible 2.9 cm thyroid nodule. Recommendation below. 4. Mildly enlarged bilateral axillary lymph nodes, probably reactive. 5. Solid pulmonary nodule of the right lung apex measuring 5 mm. Recommendation below. 6. Mild dilatation of the main pulmonary artery which can be seen in pulmonary arterial hypertension. RECOMMENDATIONS: 5 mm right solid pulmonary nodule within the upper lobe. If patient is low risk for malignancy, no routine follow-up imaging is recommended; if patient is high risk for malignancy, a non-contrast Chest CT at 12 months is optional. If performed and the nodule is stable at 12 months, no further follow-up is recommended.  These guidelines do not apply to immunocompromised patients and patients with cancer. Follow up in patients with significant comorbidities as clinically warranted. For lung cancer screening, adhere to Lung-RADS guidelines. Reference: Radiology. 2017; 284(1):228-43. 2.9 cm incidental thyroid nodule. Recommend thyroid US. Reference: J Am Margoth Radiol. 2015 Feb;12(2): 143-50     CTA HEAD W CONTRAST    Result Date: 6/18/2022  EXAMINATION: CTA OF THE HEAD WITH CONTRAST; CT OF THE HEAD WITHOUT CONTRAST 6/18/2022 3:40 pm; 6/18/2022 3:23 pm: TECHNIQUE: CTA of the head/brain was performed with the administration of intravenous contrast. Multiplanar reformatted images are provided for review. MIP images are provided for review. Automated exposure control, iterative reconstruction, and/or weight based adjustment of the mA/kV was utilized to reduce the radiation dose to as low as reasonably achievable.; CT of the head was performed without the administration of intravenous contrast. Automated exposure control, iterative reconstruction, and/or weight based adjustment of the mA/kV was utilized to reduce the radiation dose to as low as reasonably achievable. COMPARISON: None. HISTORY: ORDERING SYSTEM PROVIDED HISTORY: systolic BP 294Q TECHNOLOGIST PROVIDED HISTORY: systolic BP 161M Reason for Exam: htn.; ORDERING SYSTEM PROVIDED HISTORY: severe blood pressures, headache, 27 weeks TECHNOLOGIST PROVIDED HISTORY: severe blood pressures, headache, 27 weeks Is the patient pregnant? ->Yes Reason for Exam: htn, head FINDINGS: ANTERIOR CIRCULATION: No significant stenosis of the intracranial internal carotid, anterior cerebral, or middle cerebral arteries. No aneurysm. There is a left-sided posterior communicating artery. POSTERIOR CIRCULATION: No significant stenosis of the vertebral, basilar, or posterior cerebral arteries. No aneurysm. OTHER: No dural venous sinus thrombosis on this non-dedicated study. BRAIN: No mass effect or midline shift. No extra-axial fluid collection.  The gray-white differentiation is maintained. No acute intracranial abnormality visualized. Normal appearing [id number].  RECOMMENDATIONS: Unavailable          Assessment and Plan     Patient Active Problem List   Diagnosis    History of  x2    Chronic hypertension (meds)    History of indicated  delivery    History of pre-eclampsia x2    + HPV     AMA      Late prenatal care    History Fetal growth restriction     THC Use     Tobacco use    Anemia affecting pregnancy in second trimester    RLTCS 22 F APG  Wt 1#12    cHTN (meds) w/ HARJINDER w/ SF (G3)    Pulmonary edema (G3)    Thyroid nodule    Pulmonary hypertension (G3)         Additional assessment:  · Concerns for preeclampsia, hypertension, pulmonary edema, elevated troponin, elevated BNP      Plan:  Neuro:   Awake alert, not on any sedation   Neurochecks per protocol    Resp:  -Saturating well on room air  -Concern for pulmonary edema on recent imaging  -No PE on CT PE  -Pulmonary nodule, needs follow-up outpatient     CV:  -Hypertensive urgency, preeclampsia  -Started on hydrochlorothiazide 25, Procardia 90 daily, on labetalol 50 twice daily, labetalol as needed  -Cardiology on board as well    GI/Nutrition:   Regular diet    /Fluids/Electrolytes:   Not on any fluids, currently on magnesium sulfate treatment 2 g/h, to be off at 2350 on 2022, mag levels every 4 hours    Heme:  -Hemoglobin  stable at 12.2, platelets 634    ID:   Prophylactic antibiotics s/p     Endo:   Thyroid nodule, needs follow-up outpatient      Prophylaxis:   DVT: Heparin      Mohit Ryan MD   Internal Medicine - PGY-3  Intensive Care Unit  2022, 6:48 AM

## 2022-06-19 NOTE — PROGRESS NOTES
Resident Interval Magnesium Sulfate Note    Lin Telles is a 42 y.o. female [id number] POD# 1 s/p RLTCS  The patient is resting comfortably. She denies headache, visual changes and abdominal pain in the right upper quadrant. She denies any shortness of breath or chest pain. She denies change in her extremities, regarding swelling. Continuous Medications:    sodium chloride      sodium chloride 20 mL/hr at 06/19/22 0131    oxytocin      magnesium sulfate 1,000 mg/hr (06/19/22 1112)       Vitals:    Vitals:    06/19/22 1414 06/19/22 1456 06/19/22 1457 06/19/22 1515   BP:   (!) 169/89 (!) 154/89   Pulse: 68  63    Resp: 16 20 20    Temp:  98.8 °F (37.1 °C) 98.8 °F (37.1 °C)    TempSrc:       SpO2: 97% 99% 99% 97%         Physical Exam:  Chest: clear to auscultation bilaterally  Heart: RRR no murmur  Abdomen: soft, nontender, nondistended  Extremities: DTR normal Right: 2/4   Left: 2/4  Clonus: absent    Urine Output: 75/hr; yellow urine    Labs:  Last Magnesium Level:   Lab Results   Component Value Date    MG 7.0 06/19/2022       BMP:    Recent Labs     06/18/22 1951 06/19/22  0634 06/19/22  1209   * 128* 132*   K 3.8 3.8 4.1    99 100   CO2 15* 17* 16*   BUN 11 12 13   CREATININE 1.00* 1.29* 1.25*   GLUCOSE 84 126* 136*       ASSESSMENT/PLAN  Lin Telles is a 42 y.o. female [id number] POD# 1 s/p RLTCS   - VSS.  BP non severe and better controlled  - Continue Magnesium Sulfate Treatment @ 1g/hr   - Mag checks Q 4 hours   - Mag levels Q 6 hours   - Strict Is and Os   - SCDs   - CBC, CMP at 1800   - Mag @ 1 g/hr until 2350 on 6/19   - IVF NS @ 20 mL/hr   - s/p Lasix 40 mg x 1 (6/18)   - PreE labs abnormal, P:C 39.54          - Procardia 60 mg XL QD (6/18) > 90 mg XL QD (6/19) +30 > 120 mg XL (6/20)               - HCTZ 25 mg QD (6/19)               - Labetalol 200 mg BID          Haritha Contreras DO  Ob/Gyn Resident  6/19/2022, 3:42 PM

## 2022-06-19 NOTE — OP NOTE
Shantanu Zambrano DO PGY-1    Anesthesia: spinal with duramorph    Procedure Details   The patient was seen pre-operatively. The risks, benefits, complications, treatment options, and expected outcomes were discussed with the patient. The patient concurred with the proposed plan, giving informed consent. The patient was taken to the Operating Room, identified as Deepak Alcantara and the procedure verified as  Delivery. A Time Out was held and the above information confirmed. After spinal anesthesia, the patient was draped and prepped in the usual sterile manner. 2-4cm boils noted superior and inferior to the planned incision line. Boils noted on patient's abdomen, vulva and groin consistent with diagnosis of Hidradenitis Suppurativa. A Pfannenstiel incision was made and carried down through the subcutaneous tissue to the fascia using scalpel. Fascial incision was made and extended transversely using hand scissors for sharp dissection. The fascia was  from the underlying rectus tissue superiorly and inferiorly using blunt dissection and hand scissors. The peritoneum was identified with two hemostats and and entered with Metzenbaum scissors. Peritoneal incision was extended longitudinally with blunt stretch and Metzenbaum scissors. William-O ring was brought onto the field and inserted into the abdominal cavity in standard fashion to aid in visualization. Anterior fibroid measuring approximately 2-3cm noted on the anterior uterine surface. Avoiding the fibroid, a low transverse uterine incision was made using a new scalpel blade. Blunt stretch on the hysterotomy incision was made and the amniotomy was performed revealing meconium stained fluid. Delivered from cephalic presentation was a Live Born female infant.  The infant was suctioned, dried, immediately placed in a bowel bag and the umbilical cord was clamped and cut after one minute delayed cord clamping at the discretion of the NICU team. Fetal respiratory effort was noted. The infant was taken to the warmer and attended by NICU for evaluation. A second section of cord was clamped and cut and sent for gases. Cord blood was obtained for evaluation. The placenta was removed spontaneously with gentle traction and appeared whole with a 3 vessel cord. A 10-20% abruption and a marginal cord insertion were noted. Pitocin was started. The uterine outline appeared normal. The uterus was exteriorized and cleaned of all clots and debris. The uterine incision was closed with running sutures of 0 Vicryl. Hemostasis was observed. An imbricating layer was placed with 0 Vicryl in running fashion. The uterus was reintroduced into the abdominal cavity. Bilateral abdominal gutters were cleared of all clots and debris. Bilateral tubes and ovaries were visualized and appeared normal. The hysterotomy was again inspected and found to be hemostatic. William- O ring was removed. Rectus muscles were inspected and found to be hemostatic. The fascia was then reapproximated with running sutures x2 of 0 Vicryl. The subcuticular space was irrigated copiously. The subcuticular space was closed using a 2-0 Vicryl suture in a running fashion. The skin was reapproximated with Insorb staples. Due to close proximity of a large boil to the skin incision, attempt was made to incise and remove the cyst. Ultimately was unable to enter cyst capsule without risk of rupture or incision contamination. Superficial entry point incision was re-approximated with steri strips. The skin was then cleansed and dressed with a Prevena dressing in sterile fashion. Instrument, sponge, and needle counts were correct prior the abdominal closure and at the conclusion of the case. The urine remained clear throughout the case. Ancef and Azithromycin were given for antibiotic prophylaxis. SCDs for DVT prophylaxis remain in place for the post operative period.      Dr. [facility]U [facility] was present for the entire operation. Findings:  Viable female infant infant in cephalic presentation with Apgars of 2 at 1 minute and 6 at five minutes and 7 at ten minutes, 2cm anterior uterine fibroid, 10-20% placental abruption, normal appearing fallopian tubes and ovaries  Quantitative Blood Loss: 435ml  Total IV Fluids: 1600ml  Urine output: 150ml clear urine   Drains:  de la cruz catheter  Specimens:  placenta sent to pathology, cord blood and cord gases  Instrument and Sponge Count: Correct  Complications: none  Condition: Infant stable, transfer to NICU, Mother stable, transfer to post anesthesia recovery      Yanely Aldridge DO  OB/GYN Resident  6/19/2022, 2:45 AM    This dictation was performed by a resident physician and then was thoroughly reviewed by the attending prior to being signed.

## 2022-06-19 NOTE — ANESTHESIA PRE PROCEDURE
Department of Anesthesiology  Preprocedure Note       Name:  Yaron Goldstein   Age:  42 y.o.  :  1985                                          MRN:  [id number]         Date:  2022      Surgeon: Anjel Olivo):  Zafar Upton DO    Procedure: Procedure(s):   SECTION    Medications prior to admission:   Prior to Admission medications    Medication Sig Start Date End Date Taking?  Authorizing Provider   calcium carbonate (OS-BAR) 1250 (500 Ca) MG chewable tablet Take 200 mg by mouth daily 3/23/22  Yes Historical Provider, MD   cetirizine (ZYRTEC) 10 MG tablet Take 10 mg by mouth nightly 3/23/22  Yes Historical Provider, MD   famotidine (PEPCID) 20 MG tablet Take 20 mg by mouth 2 times daily 3/24/22  Yes Historical Provider, MD   pyridoxine (B-6) 50 MG tablet Take 25 mg by mouth daily 3/23/22  Yes Historical Provider, MD   diphenhydrAMINE (BENADRYL) 25 MG capsule Take 25 mg by mouth every 6 hours as needed    Historical Provider, MD   acetaminophen (TYLENOL) 500 MG tablet Take 2 tablets by mouth every 6 hours as needed for Pain 21   Maria Dolores Funk MD   lidocaine (LIDODERM) 5 % Place 1 patch onto the skin daily 12 hours on, 12 hours off. 21   Maria Dolores Funk MD       Current medications:    Current Facility-Administered Medications   Medication Dose Route Frequency Provider Last Rate Last Admin    sodium chloride flush 0.9 % injection 5-40 mL  5-40 mL IntraVENous 2 times per day Candie Evert, DO        sodium chloride flush 0.9 % injection 5-40 mL  5-40 mL IntraVENous PRN Candie Eevrt, DO        0.9 % sodium chloride infusion  25 mL IntraVENous PRN Candie Evert, DO        lidocaine PF 1 % injection 30 mL  30 mL Other PRN Candie Evert, DO        diphenhydrAMINE (BENADRYL) tablet 25 mg  25 mg Oral Q4H PRN Candie Evert, DO        oxytocin (PITOCIN) 30 units in 500 mL infusion  87.3 darrell-units/min IntraVENous Continuous PRN Candie Evert, DO And    oxytocin (PITOCIN) 10 unit bolus from the bag  10 Units IntraVENous PRN Reid Grade, DO        acetaminophen (TYLENOL) tablet 1,000 mg  1,000 mg Oral Q6H PRN Reid Grade, DO   1,000 mg at 06/18/22 2133    benzocaine-menthol (DERMOPLAST) 20-0.5 % spray   Topical PRN Reid Grade, DO        0.9 % sodium chloride infusion   IntraVENous Continuous Reid Grade, DO 20 mL/hr at 06/18/22 1820 Rate Change at 06/18/22 1820    [START ON 6/19/2022] prenatal vitamin plus iron 29-1 MG tablet 1 tablet  1 tablet Oral Daily Reid Grade, DO        calcium carbonate (TUMS) chewable tablet 500 mg  500 mg Oral TID PRN Reid Grade, DO        betamethasone acetate-betamethasone sodium phosphate (CELESTONE) injection 12 mg  12 mg IntraMUSCular Q24H Guerline Vish Darby, DO   12 mg at 06/18/22 1443    0.9 % sodium chloride infusion   IntraVENous PRN Reid Grade, DO        magnesium sulfate (55517 mg/500mL infusion)  2,000 mg/hr IntraVENous Continuous Reid Grade, DO 50 mL/hr at 06/18/22 2141 2,000 mg/hr at 06/18/22 2141    calcium gluconate 10 % injection 1,000 mg  1,000 mg IntraVENous PRN Reid Grade, DO        [START ON 6/19/2022] NIFEdipine (PROCARDIA XL) extended release tablet 60 mg  60 mg Oral Daily [address] [place], DO        sodium chloride flush 0.9 % injection 10 mL  10 mL IntraVENous 2 times per day Reid Grade, DO        sodium chloride flush 0.9 % injection 10 mL  10 mL IntraVENous PRN Reid Grade, DO        0.9 % sodium chloride infusion   IntraVENous PRN Reid Grade, DO        acetaminophen (TYLENOL) tablet 975 mg  975 mg Oral Once Reid Grade, DO        oxytocin (PITOCIN) 30 units in 500 mL infusion  87.3 darrell-units/min IntraVENous Continuous PRN Reid Grade, DO        And    oxytocin (PITOCIN) 10 unit bolus from the bag  10 Units IntraVENous PRN Reid Grade, DO        ondansetron (ZOFRAN) injection 4 mg  4 mg IntraVENous Q6H PRN Pema Wood DO        azithromycin (ZITHROMAX) 500 mg in dextrose 5% 250 mL IVPB  500 mg IntraVENous On Call to  Jonah Mujica DO           Allergies:     Allergies   Allergen Reactions    Asa [Aspirin]     Bee Pollen     Dust Mite Extract     Flour     Potassium-Containing Compounds     Shellfish-Derived Products     Strawberry Extract        Problem List:    Patient Active Problem List   Diagnosis Code    History of  x2 Z98.891    Chronic hypertension (meds) O10.919    History of indicated  delivery Z87.51    History of pre-eclampsia x2 Z87.59    + 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    AMA   O09.529    Late prenatal care O09.30    History Fetal growth restriction  Z87.898    THC Use  F12.10    Tobacco use Z72.0    Anemia affecting pregnancy in second trimester O99.012       Past Medical History:        Diagnosis Date    Chronic hypertension (meds) 2022    Taking labetalol 200 mg TID    History of  x2 2022    History of pre-eclampsia x2 2022    Hypertension        Past Surgical History:        Procedure Laterality Date     SECTION      x2       Social History:    Social History     Tobacco Use    Smoking status: Current Every Day Smoker     Packs/day: 0.25     Types: Cigarettes    Smokeless tobacco: Never Used   Substance Use Topics    Alcohol use: Not Currently                                Ready to quit: Not Answered  Counseling given: Not Answered      Vital Signs (Current):   Vitals:    22 2130 22 2135 22 2140 22 2200   BP: (!) 159/80  (!) 156/79 (!) 150/86   Pulse: 71  71 71   Resp:    16   Temp:       TempSrc:       SpO2: 95% 95% 95% 91%                                              BP Readings from Last 3 Encounters:   22 (!) 150/86   22 120/74   21 (!) 142/93       NPO Status: BMI:   Wt Readings from Last 3 Encounters:   04/18/22 265 lb (120.2 kg)   04/17/21 280 lb (127 kg)     There is no height or weight on file to calculate BMI.    CBC:   Lab Results   Component Value Date    WBC 10.3 06/18/2022    RBC 3.88 06/18/2022    HGB 12.4 06/18/2022    HCT 38.2 06/18/2022    MCV 98.5 06/18/2022    RDW 14.8 06/18/2022     06/18/2022       CMP:   Lab Results   Component Value Date     06/18/2022    K 3.8 06/18/2022     06/18/2022    CO2 15 06/18/2022    BUN 11 06/18/2022    CREATININE 1.00 06/18/2022    GFRAA >60 06/18/2022    LABGLOM >60 06/18/2022    GLUCOSE 84 06/18/2022    PROT 7.0 06/18/2022    CALCIUM 8.6 06/18/2022    BILITOT 0.89 06/18/2022    ALKPHOS 113 06/18/2022    AST 35 06/18/2022    ALT 43 06/18/2022       POC Tests: No results for input(s): POCGLU, POCNA, POCK, POCCL, POCBUN, POCHEMO, POCHCT in the last 72 hours. Coags:   Lab Results   Component Value Date    PROTIME 13.2 04/17/2021    INR 1.0 04/17/2021       HCG (If Applicable): No results found for: PREGTESTUR, PREGSERUM, HCG, HCGQUANT     ABGs: No results found for: PHART, PO2ART, ZXW0DOP, PZP3YWK, BEART, D1MZLKDZ     Type & Screen (If Applicable):  No results found for: LABABO, LABRH    Drug/Infectious Status (If Applicable):  No results found for: HIV, HEPCAB    COVID-19 Screening (If Applicable):   Lab Results   Component Value Date    COVID19 Not Detected 06/18/2022           Anesthesia Evaluation    Airway:           Dental:          Pulmonary:                              Cardiovascular:    (+) hypertension:,                   Neuro/Psych:               GI/Hepatic/Renal:             Endo/Other:                     Abdominal:             Vascular:           Other Findings:           Anesthesia Plan        Stefany De Leon MD   6/18/2022

## 2022-06-19 NOTE — PROGRESS NOTES
OB/GYN Resident Interval Note    Patient continues to be symptomatic with elevated blood pressures. Decision made to proceed with repeat  due to chronic HTN w/ HARJINDER w/ SF and worsening maternal status.      Anesthesia and NICU notified   Azithro, ancef, bicitra, pepcid, tylenol ordered    Vitals:    22 2130 22 2135 22 2140 22 2200   BP: (!) 159/80  (!) 156/79 (!) 150/86   Pulse: 71  71 71   Resp:    16   Temp:       TempSrc:       SpO2: 95% 95% 95% 91%         Mauro Lux DO   OB/GYN Resident  Pager [phone] [facility]  2022, 10:05 PM

## 2022-06-19 NOTE — PROGRESS NOTES
in third trimester    History of indicated  delivery    + HPV     AMA      Late prenatal care    cHTN (meds) w/ HARJINDER w/ SF (G3)    Thyroid nodule    Pulmonary hypertension (G3)  Resolved Problems:    * No resolved hospital problems. *        Recommended Follow-up:     -Optimization of blood pressure medications  -Magnesium for preeclampsia to be weaned off as per primary service        Above mentioned assessment and plan was discussed by me with the admitting medicine resident. The medicine team assigned to the patient by medicine admitting resident will be following up the patient from now onwards on the floor.      Kecia Flores MD,   Critical care resident,  Department of Internal Medicine/ Critical care  [facility] ([place]             2022, 1:37 PM

## 2022-06-19 NOTE — PROGRESS NOTES
Obstetric/Gynecology Resident Interval Note    Patient's labs reviewed below:    BNP, Trops, Creatine, ALT/AST, all down trending appropriately. Hyponatremia correcting.  Repeat labs in 6 hours    Vitals:    06/19/22 1103 06/19/22 1130 06/19/22 1200 06/19/22 1230   BP: (!) 165/99 (!) 165/99 (!) 166/93 (!) 159/98   Pulse: 74 65 68 64   Resp:  19 16 18   Temp:   98.5 °F (36.9 °C)    TempSrc:   Oral    SpO2:  99% 98% 97%     Recent Results (from the past 12 hour(s))   Brain Natriuretic Peptide    Collection Time: 06/19/22  6:34 AM   Result Value Ref Range    Pro-BNP 1,904 (H) <300 pg/mL   CBC auto differential    Collection Time: 06/19/22  6:34 AM   Result Value Ref Range    WBC 15.6 (H) 3.5 - 11.3 k/uL    RBC 3.79 (L) 3.95 - 5.11 m/uL    Hemoglobin 12.2 11.9 - 15.1 g/dL    Hematocrit 37.5 36.3 - 47.1 %    MCV 98.9 82.6 - 102.9 fL    MCH 32.2 25.2 - 33.5 pg    MCHC 32.5 28.4 - 34.8 g/dL    RDW 14.9 (H) 11.8 - 14.4 %    Platelets 857 780 - 159 k/uL    MPV 10.1 8.1 - 13.5 fL    NRBC Automated 0.0 0.0 per 100 WBC    RBC Morphology ANISOCYTOSIS PRESENT     Seg Neutrophils 83 (H) 36 - 65 %    Lymphocytes 10 (L) 24 - 43 %    Monocytes 7 3 - 12 %    Eosinophils % 0 (L) 1 - 4 %    Basophils 0 0 - 2 %    Immature Granulocytes 1 (H) 0 %    Segs Absolute 12.88 (H) 1.50 - 8.10 k/uL    Absolute Lymph # 1.54 1.10 - 3.70 k/uL    Absolute Mono # 1.05 0.10 - 1.20 k/uL    Absolute Eos # <0.03 0.00 - 0.44 k/uL    Basophils Absolute 0.03 0.00 - 0.20 k/uL    Absolute Immature Granulocyte 0.10 0.00 - 0.30 k/uL   Comprehensive metabolic panel    Collection Time: 06/19/22  6:34 AM   Result Value Ref Range    Glucose 126 (H) 70 - 99 mg/dL    BUN 12 6 - 20 mg/dL    CREATININE 1.29 (H) 0.50 - 0.90 mg/dL    Calcium 8.5 (L) 8.6 - 10.4 mg/dL    Sodium 128 (L) 135 - 144 mmol/L    Potassium 3.8 3.7 - 5.3 mmol/L    Chloride 99 98 - 107 mmol/L    CO2 17 (L) 20 - 31 mmol/L    Anion Gap 12 9 - 17 mmol/L    Alkaline Phosphatase 103 35 - 104 U/L    ALT 38 (H) 5 - 33 U/L    AST 23 <32 U/L    Total Bilirubin 0.48 0.3 - 1.2 mg/dL    Total Protein 6.6 6.4 - 8.3 g/dL    Albumin 2.9 (L) 3.5 - 5.2 g/dL    Albumin/Globulin Ratio 0.8 (L) 1.0 - 2.5    GFR Non- 46 (L) >60 mL/min    GFR  56 (L) >60 mL/min    GFR Comment         Magnesium    Collection Time: 06/19/22  6:34 AM   Result Value Ref Range    Magnesium 6.3 (HH) 1.6 - 2.6 mg/dL   Troponin    Collection Time: 06/19/22  6:34 AM   Result Value Ref Range    Troponin, High Sensitivity 25 (H) 0 - 14 ng/L   MRSA DNA Probe, Nasal    Collection Time: 06/19/22  8:30 AM    Specimen: Nasal   Result Value Ref Range    Specimen Description . NASAL SWAB     MRSA, DNA, Nasal NEGATIVE NEGATIVE   Magnesium    Collection Time: 06/19/22 10:01 AM   Result Value Ref Range    Magnesium 7.0 (HH) 1.6 - 2.6 mg/dL   CBC    Collection Time: 06/19/22 12:09 PM   Result Value Ref Range    WBC 19.4 (H) 3.5 - 11.3 k/uL    RBC 3.73 (L) 3.95 - 5.11 m/uL    Hemoglobin 11.8 (L) 11.9 - 15.1 g/dL    Hematocrit 34.9 (L) 36.3 - 47.1 %    MCV 93.6 82.6 - 102.9 fL    MCH 31.6 25.2 - 33.5 pg    MCHC 33.8 28.4 - 34.8 g/dL    RDW 14.9 (H) 11.8 - 14.4 %    Platelets 993 450 - 424 k/uL    MPV 9.6 8.1 - 13.5 fL    NRBC Automated 0.0 0.0 per 100 WBC   Comprehensive Metabolic Panel    Collection Time: 06/19/22 12:09 PM   Result Value Ref Range    Glucose 136 (H) 70 - 99 mg/dL    BUN 13 6 - 20 mg/dL    CREATININE 1.25 (H) 0.50 - 0.90 mg/dL    Calcium 8.5 (L) 8.6 - 10.4 mg/dL    Sodium 132 (L) 135 - 144 mmol/L    Potassium 4.1 3.7 - 5.3 mmol/L    Chloride 100 98 - 107 mmol/L    CO2 16 (L) 20 - 31 mmol/L    Anion Gap 16 9 - 17 mmol/L    Alkaline Phosphatase 105 (H) 35 - 104 U/L    ALT 36 (H) 5 - 33 U/L    AST 23 <32 U/L    Total Bilirubin 0.50 0.3 - 1.2 mg/dL    Total Protein 6.8 6.4 - 8.3 g/dL    Albumin 2.9 (L) 3.5 - 5.2 g/dL    Albumin/Globulin Ratio 0.7 (L) 1.0 - 2.5    GFR Non- 48 (L) >60 mL/min    GFR African American 58 (L) >60 mL/min    GFR Comment         Brain Natriuretic Peptide    Collection Time: 06/19/22 12:09 PM   Result Value Ref Range    Pro-BNP 1,649 (H) <300 pg/mL   Troponin    Collection Time: 06/19/22 12:09 PM   Result Value Ref Range    Troponin, High Sensitivity 22 (H) 0 - 14 ng/L       Marshall Lockhart DO  OB/GYN Resident, PGY3  [address] [facility]  6/19/2022, 1:02 PM

## 2022-06-19 NOTE — CARE COORDINATION
Patient now in MICU, I did not personally receive consult for evaluation.  Will sign off please reconsult if needed    [phone] [address] Garcia

## 2022-06-19 NOTE — PROGRESS NOTES
OB/GYN Resident Interval Note    Will give hctz 25 mg PO and labetalol 80 mg IV now as blood pressures are still in severe range     Vitals:    06/19/22 0250 06/19/22 0253 06/19/22 0255 06/19/22 0300   BP:  (!) 153/95 (!) 158/93 (!) 161/88   Pulse:  59 58 59   Resp:       Temp:       TempSrc:       SpO2: 97%  95% 97%        Popeye Bui DO   OB/GYN Resident [facility]  6/19/2022, 3:46 AM

## 2022-06-19 NOTE — PROGRESS NOTES
Resident Interval Magnesium Sulfate Note    Karla Borges is a 42 y.o. female [id number] PPD# 1 s/p RLTCS   The patient is resting comfortably. She denies headache, visual changes and abdominal pain in the right upper quadrant. She denies any shortness of breath or chest pain. She denies change in her extremities, regarding swelling. Continuous Medications:    sodium chloride      sodium chloride 20 mL/hr at 06/19/22 0131    oxytocin      magnesium sulfate 2,000 mg/hr (06/19/22 0602)       Vitals:    Vitals:    06/19/22 1000 06/19/22 1030 06/19/22 1100 06/19/22 1103   BP: (!) 163/102 (!) 155/99 (!) 165/99 (!) 165/99   Pulse: 61 62 64 74   Resp: 10 13 14    Temp:       TempSrc:       SpO2: 98%  97%          Physical Exam:  Chest: clear to auscultation bilaterally  Heart: RRR no murmur  Abdomen: soft, nontender, nondistended  Extremities: DTR normal Right: 2/4   Left: 2/4  Clonus: absent    Urine Output: 125/hr; Cloudy urine    Labs:  Last Magnesium Level:   Lab Results   Component Value Date    MG 6.3 06/19/2022       BMP:    Recent Labs     06/18/22  1357 06/18/22  1951 06/19/22  0634    133* 128*   K 3.7 3.8 3.8    102 99   CO2 20 15* 17*   BUN 11 11 12   CREATININE 1.15* 1.00* 1.29*   GLUCOSE 74 84 126*       ASSESSMENT/PLAN  Karla Borges is a 42 y.o. female [id number] PPD# 1 s/p RLTCS   - VSS.  BP still severe  - Continue Magnesium Sulfate Treatment @ 1g/hr due to increasing mag level   - Mag checks Q 4 hours   - Mag levels Q 6 hours   - Strict Is and Os   - SCDs and Heparin    - CBC, CMP, Trop, BNP @ 1200    - Mag 4g> 1g/hr > 2g/hr > 1g/hr until 2350 on 6/19    - IVF: NS @ 20 mL/hr      - S/p Lasix 40 mg x1 (6/18)    - PreE labs abnl, P/C 39.54 (6/18)    - Procardia 60 mg XL QD (6/18) > 90 mg XL QD (6/19) +30 > 120 mg XL (6/20)    - HCTZ 25 mg QD (6/19)    - Labetalol 200 mg BID    - IV in last 24 hr:     S/p Hydralazine 10mg IV x1(last 6/19 @ 1133)     S/p Procardia 10 mg PO x1 (last 6/18 @ 2010)     S/p Labetalol 20x2, 40x1, 80x1, 40x1 (last 6/19 @ 1230)          Cheyenne Gee DO  Ob/Gyn Resident  6/19/2022, 10:46 AM

## 2022-06-19 NOTE — PROGRESS NOTES
OB/GYN Resident Interval Note    Due to Cr improving to 1 with most recent mag level being 2.8 will increase mag sulfate to 2g/hr     Dr Tran Barnard in agreement     Belkys Montero DO   OB/GYN Resident   [address] [place], [place]  6/18/2022, 9:37 PM

## 2022-06-19 NOTE — FLOWSHEET NOTE
06/19/22 0715   Maternal Vitals (MEW-T)   BP (!) 169/75   Dr. Griselda Coon notified, also notified of newest troponin result

## 2022-06-19 NOTE — PROGRESS NOTES
POST OPERATIVE DAY # 1    Glennie Soulier is a 42 y.o. female   This patient was seen and examined today. RLTCS on 22    Her pregnancy was complicated by:   Patient Active Problem List   Diagnosis    History of  x2    Chronic hypertension (meds)    History of indicated  delivery    History of pre-eclampsia x2    + HPV     AMA      Late prenatal care    History Fetal growth restriction     THC Use     Tobacco use    Anemia affecting pregnancy in second trimester    RLTCS 22 F APG / Wt 1#12    cHTN (meds) w/ HARJINDER w/ SF (G3)    Pulmonary edema (G3)    Thyroid nodule    Pulmonary hypertension (G3)     Today she is doing well without any chief complaint. Her lochia is light. She denies chest pain, shortness of breath, headache, lightheadedness, blurred vision and peripheral edema. States the symptoms she was feeling prior to delivery have resolved. She plans to breast pump for the baby but has not attempted yet. She has not tried ambulating yet due to being on mag sulfate and having de la cruz cath in place. She is voiding appropriately with de la cruz cath. She currently denies S/S of postpartum depression. Flatus absent. Bowel movement absent. She has tried tolerating solids since surgery. States she is having full body itching.      Vital Signs:  Vitals:    22 0300   BP: (!) 161/88   Pulse: 59   Resp:    Temp:    SpO2: 97%      Urine Input & Output last 24hrs:     Intake/Output Summary (Last 24 hours) at 2022 0335  Last data filed at 2022 0056  Gross per 24 hour   Intake 2202 ml   Output 1885 ml   Net 317 ml   urine output 100 mL/ hr     Physical Exam:  General:  no apparent distress, alert and cooperative  Neurologic:  alert, oriented, normal speech, no focal findings or movement disorder noted  Lungs:  No increased work of breathing, good air exchange, clear to auscultation bilaterally, no crackles or wheezing  Heart:  Regular rate and rhythm   Abdomen: abdomen soft, non-distended, non-tender, bowel sounds present   Fundus: non-tender, firm, below umbilicus  Incision: prevena in place with adequate seal   Extremities:  no calf tenderness BL LE, non edematous BL LE, SCDs in place, reflexes +2/4    Labs:  Lab Results   Component Value Date    WBC 10.3 06/18/2022    HGB 12.4 06/18/2022    HCT 38.2 06/18/2022    MCV 98.5 06/18/2022     06/18/2022       Assessment/Plan:  1. Cindi Phillips is a [id number] POD # 1 s/p RLTCS   - Doing well, VSS except for blood pressures    - Female infant in NICU    - Encourage ambulation and use of incentive spirometer   - Duramorph and Toradol for pain control then will switch to tylenol and Therese scheduled    - Will give nubain for pruritis s/p spinal   2. Rh positive/Rubella immune  3. Breast Pumping  - Lactation consulted to help with pumping due to NICU admission   4. Chronic HTN with HARJINDER with Severe Features (headache, pulm edema, BP)  - Cardio consulted and following, appreciate recs for resistant severe range blood pressures   - Blood pressures continue to be in severe range   - Continue mag sulfate @ 2g/hr until 2350 this evening   - Headache has resolved   - Labs    P/C 39.54   Cr 1.15 > 1   ALT 46>43   AST 29>35   Mag level 2.8   - Trending mag levels every 4 hrs  - IVF: NS @ 20 mL /hr   - Continue with Procardia 60 mg XL QD, hold home medication of labetalol 200 mg BID   - S/p Hydralazine 5 mg IV x2, Hydralazine 10 mg IV x3, Procardia 10 mg PO x1, Procardia 30 mg XL x2, Labetalol 20 mg IV x1. Most recently gave labetalol 40 mg IV   - Will start with HCTZX 25 mg PO this morning, cardiology in agreement   - Strict I&O, will pull de la cruz when mag sulfate is complete   - CT Head 6/18/22 showing no acute intracranial process   5.  Pulmonary HTN with Pulmonary Edema and Bilateral Pleural Effusions   - Reports shortness of breath and chest pain have resolved   - SpO2 97% on room air   - Cardiology following   - S/p Lasix 40 mg x1 (6/18)  - Labs    BNP 1578   Trops down trending 20>18    COVID19 negative   - Imaging    Echo (6/18/22) showing EF 58% with mild pulmonary HTN and no pericardial effusions    CT PE (6/18/22) showing no PE with trace bilateral pleural effusions and mild dilation of main pulmonary artery    EKG (6/18/22): sinus madelin   - Will repeat trop and BNP this morning   6. Thyroid Nodule   - 2.9 cm thyroid nodule noted on CT PE from 6/18/22  - TSH was 0.22 with free T4 1.05  - Patient to follow up with PCP for possible thyroid ultrasound   7. Right Lung Nodule   - 5 mm solid right nodule noted on CT PE from 6/18/22  8. Bradycardia   - Has resolved   - EKG 6/18/22 showing sinus madelin   - Cardiology following   9. Hidradenitis Suppurativa   - Patient plans to breast pump so will hold off on treating with doxycyline PO   - Topical clinda BID to affected areas   10. Late and Limited Prenatal Care   - SW consulted   6. Tobacco Use   - Cessation encouraged due to risk of SIDs and nicotine decreases optimal wound healing   12. THC Use   - UDS + on admission   - SW consulted   - Cessation encouraged   13. BMI 42    - Heparin 7500 mL BID for DVT prophylaxis   - Prevena needs removed 1 week from surgery   - Keflex and Flagyl x 48 hrs for wound infection prophylaxis   14. Continue post-op care. Counseling Completed:  Secondary Smoke risks and Sudden Infant Death Syndrome were reviewed with recommendations. Infant sleeping, \"back to sleep\" and avoidance of co-sleeping recommendations were reviewed. Signs and Symptoms of Post Partum Depression were reviewed. The patient is to call if any occur. Signs and symptoms of Mastitis were reviewed. The patient is to call if any occur for follow up.   Discharge instructions including pelvic rest, incision care, 15 lb weight restriction, no driving with pain medicine and office follow-up were reviewed with patient     Attending Physician: Dr. Masha Uribe, DO  Ob/Gyn Resident  6/19/2022, 3:35 AM

## 2022-06-19 NOTE — ANESTHESIA PRE PROCEDURE
And    oxytocin (PITOCIN) 10 unit bolus from the bag  10 Units IntraVENous PRN Delos Fire, DO        acetaminophen (TYLENOL) tablet 1,000 mg  1,000 mg Oral Q6H PRN Delos Fire, DO   1,000 mg at 06/18/22 2133    benzocaine-menthol (DERMOPLAST) 20-0.5 % spray   Topical PRN Delos Fire, DO        0.9 % sodium chloride infusion   IntraVENous Continuous Delos Fire, DO 20 mL/hr at 06/18/22 1820 Rate Change at 06/18/22 1820    [START ON 6/19/2022] prenatal vitamin plus iron 29-1 MG tablet 1 tablet  1 tablet Oral Daily Delos Fire, DO        calcium carbonate (TUMS) chewable tablet 500 mg  500 mg Oral TID PRN Delos Fire, DO        betamethasone acetate-betamethasone sodium phosphate (CELESTONE) injection 12 mg  12 mg IntraMUSCular Q24H Guerline Remingtono Wilner, DO   12 mg at 06/18/22 1443    0.9 % sodium chloride infusion   IntraVENous PRN Delos Fire, DO        magnesium sulfate (61346 mg/500mL infusion)  2,000 mg/hr IntraVENous Continuous Delos Fire, DO 50 mL/hr at 06/18/22 2141 2,000 mg/hr at 06/18/22 2141    calcium gluconate 10 % injection 1,000 mg  1,000 mg IntraVENous PRN Delos Fire, DO        [START ON 6/19/2022] NIFEdipine (PROCARDIA XL) extended release tablet 60 mg  60 mg Oral Daily [facility],         sodium chloride flush 0.9 % injection 10 mL  10 mL IntraVENous 2 times per day Delos Fire, DO        sodium chloride flush 0.9 % injection 10 mL  10 mL IntraVENous PRN Delos Fire, DO        0.9 % sodium chloride infusion   IntraVENous PRN Delos Fire, DO        acetaminophen (TYLENOL) tablet 975 mg  975 mg Oral Once Delos Fire, DO        oxytocin (PITOCIN) 30 units in 500 mL infusion  87.3 darrell-units/min IntraVENous Continuous PRN Delos Fire, DO        And    oxytocin (PITOCIN) 10 unit bolus from the bag  10 Units IntraVENous PRN Delos Fire, DO        ondansetron (ZOFRAN) injection 4 mg  4 mg IntraVENous Q6H PRN Galen Res, DO        azithromycin (ZITHROMAX) 500 mg in dextrose 5% 250 mL IVPB  500 mg IntraVENous On Call to  Jonah Mujica DO           Allergies:     Allergies   Allergen Reactions    Asa [Aspirin]     Bee Pollen     Dust Mite Extract     Flour     Potassium-Containing Compounds     Shellfish-Derived Products     Strawberry Extract        Problem List:    Patient Active Problem List   Diagnosis Code    History of  x2 Z98.891    Chronic hypertension (meds) O10.919    History of indicated  delivery Z87.51    History of pre-eclampsia x2 Z87.59    + HPV  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    AMA   O09.529    Late prenatal care O09.30    History Fetal growth restriction  Z87.898    THC Use  F12.10    Tobacco use Z72.0    Anemia affecting pregnancy in second trimester O99.012       Past Medical History:        Diagnosis Date    Chronic hypertension (meds) 2022    Taking labetalol 200 mg TID    History of  x2 2022    History of pre-eclampsia x2 2022    Hypertension        Past Surgical History:        Procedure Laterality Date     SECTION      x2       Social History:    Social History     Tobacco Use    Smoking status: Current Every Day Smoker     Packs/day: 0.25     Types: Cigarettes    Smokeless tobacco: Never Used   Substance Use Topics    Alcohol use: Not Currently                                Ready to quit: Not Answered  Counseling given: Not Answered      Vital Signs (Current):   Vitals:    22 2130 22 2135 22 2140 22 2200   BP: (!) 159/80  (!) 156/79 (!) 150/86   Pulse: 71  71 71   Resp:    16   Temp:       TempSrc:       SpO2: 95% 95% 95% 91%                                              BP Readings from Last 3 Encounters:   22 (!) 150/86   22 120/74   21 (!) 142/93       NPO Status:  MN solids BMI:   Wt Readings from Last 3 Encounters:   04/18/22 265 lb (120.2 kg)   04/17/21 280 lb (127 kg)     There is no height or weight on file to calculate BMI.    CBC:   Lab Results   Component Value Date    WBC 10.3 06/18/2022    RBC 3.88 06/18/2022    HGB 12.4 06/18/2022    HCT 38.2 06/18/2022    MCV 98.5 06/18/2022    RDW 14.8 06/18/2022     06/18/2022       CMP:   Lab Results   Component Value Date     06/18/2022    K 3.8 06/18/2022     06/18/2022    CO2 15 06/18/2022    BUN 11 06/18/2022    CREATININE 1.00 06/18/2022    GFRAA >60 06/18/2022    LABGLOM >60 06/18/2022    GLUCOSE 84 06/18/2022    PROT 7.0 06/18/2022    CALCIUM 8.6 06/18/2022    BILITOT 0.89 06/18/2022    ALKPHOS 113 06/18/2022    AST 35 06/18/2022    ALT 43 06/18/2022       POC Tests: No results for input(s): POCGLU, POCNA, POCK, POCCL, POCBUN, POCHEMO, POCHCT in the last 72 hours. Coags:   Lab Results   Component Value Date    PROTIME 13.2 04/17/2021    INR 1.0 04/17/2021       HCG (If Applicable): No results found for: PREGTESTUR, PREGSERUM, HCG, HCGQUANT     ABGs: No results found for: PHART, PO2ART, RZT2IBW, FTH4URE, BEART, S1GNYIOO     Type & Screen (If Applicable):  No results found for: LABABO, LABRH    Drug/Infectious Status (If Applicable):  No results found for: HIV, HEPCAB    COVID-19 Screening (If Applicable):   Lab Results   Component Value Date    COVID19 Not Detected 06/18/2022           Anesthesia Evaluation   no history of anesthetic complications:   Airway: Mallampati: III     Neck ROM: full     Dental:          Pulmonary:       (-) recent URI                          ROS comment: .5 ppd  YOVANI not tested   Cardiovascular:    (+) hypertension:,                ROS comment: EF 58%     Neuro/Psych:      (-) seizures           GI/Hepatic/Renal:   (+) morbid obesity          Endo/Other:                      ROS comment: HARJINDER Abdominal:             Vascular:           Other Findings: Anesthesia Plan      spinal     ASA 4                                   Khushboo Khan MD   6/18/2022

## 2022-06-19 NOTE — PROGRESS NOTES
Resident Interval Magnesium Sulfate Note    Yancy Skinner is a 42 y.o. female [id number] PPD# 1 s/p RLTCS on 6/18/22  The patient is resting comfortably. She denies headache, visual changes, abdominal pain in the right upper quadrant, nausea/vomiting, backache and dysuria. She denies any shortness of breath or chest pain. She denies change in her extremities, regarding swelling. Continuous Medications:    sodium chloride      sodium chloride 20 mL/hr at 06/19/22 0131    oxytocin      magnesium sulfate 1,000 mg/hr (06/19/22 1112)     Vitals:    Vitals:    06/19/22 1604 06/19/22 1657 06/19/22 1800 06/19/22 1900   BP: 136/71 (!) 145/79 (!) 157/81 (!) 142/85   Pulse: 61 64 68 66   Resp: 20 20 16 16   Temp:   97.5 °F (36.4 °C)    TempSrc:   Oral    SpO2: 98% 99% 97% 98%     Physical Exam:  Chest: clear to auscultation bilaterally  Heart: RRR no murmur  Abdomen: soft, nontender, nondistended  Extremities: DTR normal BL UE Right: +2/4   Left: +2/4  Clonus: absent    Urine Output: 200 mL between 1815 and 1915; Clear urine    Labs:  Last Magnesium Level:   Lab Results   Component Value Date    MG 6.9 06/19/2022       BMP:    Recent Labs     06/19/22  0634 06/19/22  1209 06/19/22  1813   * 132* 132*   K 3.8 4.1 3.9   CL 99 100 100   CO2 17* 16* 18*   BUN 12 13 14   CREATININE 1.29* 1.25* 1.23*   GLUCOSE 126* 136* 133*       ASSESSMENT/PLAN  Yancy Skinner is a 42 y.o. female [id number] PPD# 1 s/p RLTCS on 6/18/22 with a dx of cHTN (meds) w/ HARJINDER w/ SF (BP, HA, pulm edema)    - Continue Magnesium Sulfate Treatment 1g/hr, off @ 2350 on 6/19/22   - Mag levels q6hrs per provider. Last Mag level was 6.9   - BPs elevated but non severe since 1457 on 6/19/22   - Patient received a total of Procardia 120 XL today (90 XL then an additional 30 XL). Will initiate 120 XL qd from 6/20/22.  Continue with labetalol 200 TID    - Patient denies any s/s PreE   - PreE labs abnl, P/C 39.54 (6/18)    - ALT/AST trend 46/29>43/35>38/23>36/23>32/22    - BNP improving 1578>1904>1649>1356    - Trop stable: 20>18>25>22>21    - Cr improving, trend: 1.15>1>1.29>1.25>1.23    - UOP adequate   - Continue to monitor closely     Julia Abdi MD  Ob/Gyn Resident  6/19/2022, 7:13 PM

## 2022-06-19 NOTE — PROGRESS NOTES
OB/GYN Resident Interval Note    Patient continues to have severe range blood pressures that are resistant to the several medications we have given her since admission     She has received    Procardia 60 mg XL QD   HCTZ 25 mg QD   Hydralazine 5 mg IV x2   Hydralazine 10 mg IV x3    Procardia 10 mg PO x1   Labetalol 20 mg IV x2   Labetalol 40 mg x1   Labetalol  80 mg x1   And she is still having severe range pressures. Repeat labs that were ordered from 4 am still have not been drawn due to patient being a difficult stick. Due to these refractory blood pressures, patient likely needs to be started on a Cardene drop and transferred to critical care. Cardiology has been updated through out the night about her status and recommend intermed consultation as well. Intermed NP has been perfect served. She will need to continue treatment with mag sulfate for seizure prophylaxis for her superimposed preeclampsia. Awaiting bed status for crit care transfer for optimal blood pressure management.      Dr Ariane Pearce updated about transfer plans       Sandy Noonan DO   OB/GYN Resident   [address] [facility]  6/19/2022, 6:25 AM

## 2022-06-19 NOTE — PROGRESS NOTES
Obstetric/Gynecology Resident Interval Note: Blood Pressure Management in Pregnancy and the Immediate Postpartum Period    Sample Order Set for Severe Intrapartum or Postpartum Hypertension Initial First-line Management With Hydralazine (if systolic BP is greater than or equal to 160 mm Hg or more or if diastolic BP is greater than or equal to 110 mm Hg or more on two occasions 15 minutes apart)    If severe BP elevations persist for 15 minutes or more, administer hydralazine (5 mg or 10 mg IV for more than 2 minutes). Repeat BP measurement in 20 minutes and record results. If either BP threshold is still exceeded, administer hydralazine (10 mg IV for more than 2 minutes). If BP is below threshold, continue to monitor BP closely (see below). Repeat BP measurement in 20 minutes and record results. If either BP threshold is still exceeded, administer labetalol (20 mg IV for more than 2 minutes). If BP is below threshold, continue to monitor BP closely. Repeat BP measurement in 10 minutes and record results. If either BP threshold is still exceeded, administer labetalol (40 mg IV for more than 2 minutes) and obtain emergency consultation from maternal-fetal medicine, internal medicine, anesthesia, or critical care subspecialists. Blood Pressure Monitoring Algorithm (after treatment with IV meds) Once the aforementioned BP thresholds are achieved, repeat BP measurement every 10 minutes for 1 hour, then every 15 minutes for 1 hour, then every 30 minutes for 1 hour, and then every hour for 4 hours. Box 6.   Sample Order Set for Severe Intrapartum or Postpartum Hypertension Initial First-line Management With Labetalol (if systolic BP is greater than or equal to 160 mm Hg or more or if diastolic BP is greater than or equal to 110 mm Hg or more on two occasions 15 minutes apart)    If severe BP elevations persist for 15 minutes or more, administer labetalol (20 mg IV for more than 2 minutes). Repeat BP measurement in 10 minutes and record results. If either BP threshold is still exceeded, administer labetalol (40 mg IV for more than 2 minutes). If BP is below threshold, continue to monitor BP closely see below). Repeat BP measurement in 10 minutes and record results. If either BP threshold is still exceeded, administer labetalol (80 mg IV for more than 2 minutes). If BP is below threshold, continue to monitor BP closely. Repeat BP measurement in 10 minutes and record results. If either BP threshold is still exceeded, administer hydralazine (10 mg IV for more than 2 minutes). If BP is below threshold, continue to monitor BP closely. Repeat BP measurement in 20 minutes and record results. If either BP threshold is still exceeded, obtain emergency consultation from maternal-fetal medicine, internal medicine, anesthesia, or critical care subspecialists. Give additional antihypertensive medication per specific order. Blood Pressure Monitoring Algorithm (after treatment with IV meds). Once the aforementioned BP thresholds are achieved , repeat BP measurement every 10 minutes for 1 hour, then every 15 minutes for 1 hour, then every 30 minutes for 1 hour, and then every hour for 4 hours. Data from Reuben Feliz GL, Pablo HR, Stuart Ramirez Izzo Georgiana, et al. Seventh report of the [employer] on Prevention, Detection, Evaluation, and Treatment of High Blood Pressure. Joint National Committee on Prevention, Detection, Evaluation, and Treatment of High Blood Pressure, National Heart, Lung, and Blood [place], National High Blood Pressure Education Program Coordinating Committee. Hypertension 2003;42:1206-52.     Please do not hesitate to reach out with questions    Barry Silva DO  OB/GYN Resident, PGY3  [facility]  6/19/2022, 10:46 AM

## 2022-06-19 NOTE — BRIEF OP NOTE
Department of Obstetrics and Gynecology  Obstetrical Brief Operative Report  [facility]    Patient: Dela Meckel   : 1985  MRN: [id number]       Acct: [de-identified]   Date of Procedure: 22    Pre-operative Diagnosis: 42 y.o. female  at 27w6d  Pregnancy   Chronic HTN on medications with HARJINDER with SF (BP, HA, pulm edema)  Pulmonary HTN  Pulmonary edema with bilateral pleural effusions   Thyroid nodule   Right lung nodule  Bradycardia   Abdominal Pain  History of C/S x2  Hidradenitis suppurativa    Anemia  History of iPTD @ 26 weeks  AMA  Tobacco use  BMI 42     Post-operative Diagnosis: Viable female infant. Same as above. Uterine fibroid. 10-20% placental abruption. Procedure: repeat low transverse  section    Surgeon: Derrick Wood DO  Assistant(s): Princess Montoya DO PGY-4; Placido Jo DO, PGY2; Anisha Harris DO PGY-1    Anesthesia: spinal with Duramorph    Information for the patient's :  Kimber Vaughn [[id number]]   female   Birth Weight: N/A     Information for the patient's :  Kimber Vaughn [[id number]]          Findings:  Live Born female infant in cephalic presentation with Apgars and weight pending. normal appearing uterus tubes and ovaries, 2cm anterior uterine fibroid, 10-20% placental abruption  Quantitative Blood Loss: pending  Total IV Fluids: 1600ml  Urine output: 150ml clear urine   Drains:  de la cruz catheter  Specimens:  placenta sent to pathology, cord blood and cord gases  Instrument and Sponge Count: Correct  Complications: none  Condition: Infant stable, transfer to NICU, Mother stable, transfer to post anesthesia recovery    See dictated operative report for full details.     Placido Jo DO  Ob/Gyn Resident  2022, 12:52 AM

## 2022-06-19 NOTE — PROGRESS NOTES
Resident Interval Post-Partum Magnesium Sulfate Note    Ada Casanova is a 42 y.o. female [id number] POD# 1 s/p RLTCS on 22  The patient is resting comfortably. She denies headache, visual changes and abdominal pain in the right upper quadrant. She denies any shortness of breath or chest pain. She denies change in her extremities, regarding swelling. Continuous Medications:    sodium chloride      sodium chloride 20 mL/hr at 22 0131    oxytocin      magnesium sulfate 2,000 mg/hr (22 0602)       Vitals:    Vitals:    22 0620 22 0621 22 0625 22 0630   BP:  (!) 159/85  (!) 155/79   Pulse:  63  61   Resp:       Temp:       TempSrc:       SpO2: 98%  97% 97%       Physical Exam:  Chest: clear to auscultation bilaterally  Heart: RRR, no murmur  Abdomen: soft, nontender, nondistended  Extremities: DTR decreased Right: 1/4   Left: 1/4  Clonus: absent    Urine Output: 1600ml/7hr; Clear urine    Labs:  Last Magnesium Level:   Lab Results   Component Value Date    MG 2.8 2022       BMP:    Recent Labs     22  1357 22    133*   K 3.7 3.8    102   CO2 20 15*   BUN 11 11   CREATININE 1.15* 1.00*   GLUCOSE 74 84       ASSESSMENT/PLAN  Ada Casanova is a 42 y.o. female  POD# 1 s/p RLTCS with cHTN with superimposed PreEclampsia with Severe features   - Continue Magnesium Sulfate Treatment 2g/hr, off @ 2350 on 22   - Mag levels q4hrs per provider   - last Mag level 2.8   - BPs elevated to severe range, last severe range pressure requiring treatment at 0400 on    - Patient denies any s/s PreE   - UOP adequate   - Continue to monitor closely    Senior resident updated and agreeable to plan. Jeff Chong DO  Ob/Gyn Resident  2022, 6:40 AM           Attending Physician Statement  I have discussed the care of Ada Casanova, including pertinent history and exam findings,  with the resident.  I have seen and examined the patient and the key elements of all parts of the encounter have been performed by me. I agree with the assessment, plan and orders as documented by the resident.   [facility] Modifier)    Nelson Ohara DO

## 2022-06-19 NOTE — PROGRESS NOTES
Resident Interval Magnesium Sulfate Note    Xiao Roberts is a 42 y.o. female [id number] POD# 1 s/p RLTCS  The patient is resting comfortably. She denies headache, visual changes and abdominal pain in the right upper quadrant. She denies any shortness of breath or chest pain. She denies change in her extremities, regarding swelling.     Continuous Medications:    sodium chloride      sodium chloride 20 mL/hr at 22 0131    oxytocin      magnesium sulfate 2,000 mg/hr (22 2307)       Vitals:    Vitals:    22 0300   BP: (!) 161/88   Pulse: 59   Resp:    Temp:    SpO2: 97%     Physical Exam:  Chest: clear to auscultation bilaterally  Heart: RRR no murmur  Abdomen: soft, tender around incision site, nondistended  Extremities: DTR normal Right: 2/4   Left: 2/4 lower extremity   Clonus: absent    Urine Output: 100 mL/hr; Clear urine    Labs:  Last Magnesium Level:   Lab Results   Component Value Date    MG 2.8 2022       BMP:    Recent Labs     22  1357 22  1951    133*   K 3.7 3.8    102   CO2 20 15*   BUN 11 11   CREATININE 1.15* 1.00*   GLUCOSE 74 84       ASSESSMENT/PLAN  Xiao Roberts is a 42 y.o. female  POD# 1 s/p RLTCS   - VSS except for continued severe range blood pressures    - Continue Magnesium Sulfate Treatment 2g/hr, off @ 2350 on 22   - Mag levels q 4hrs per provider   - Patient denies any s/s PreE   - UOP adequate, continue with de la cruz cath    - S/p Hydralazine 5 mg IV x2, 10mg IV x3 (last  @ 1826)   - S/p Procardia 10 mg PO x1 (last  @  )   - S/p Labetalol 20 mg IV x2, 40 mg x1 (last  @ 0242)   - Continue with Procardia 60 mg XL QD and will start HCTZ this morning    - Cardio following     Lisa Henderson DO  Ob/Gyn Resident  2022, 3:13 AM

## 2022-06-20 ENCOUNTER — APPOINTMENT (OUTPATIENT)
Dept: ULTRASOUND IMAGING | Age: 37
DRG: 540 | End: 2022-06-20
Payer: MEDICARE

## 2022-06-20 ENCOUNTER — TELEPHONE (OUTPATIENT)
Dept: PULMONOLOGY | Age: 37
End: 2022-06-20

## 2022-06-20 PROBLEM — E04.2 MULTINODULAR GOITER: Status: ACTIVE | Noted: 2022-06-20

## 2022-06-20 LAB
ABSOLUTE EOS #: 0.04 K/UL (ref 0–0.44)
ABSOLUTE IMMATURE GRANULOCYTE: 0.08 K/UL (ref 0–0.3)
ABSOLUTE LYMPH #: 1.69 K/UL (ref 1.1–3.7)
ABSOLUTE MONO #: 0.86 K/UL (ref 0.1–1.2)
ALBUMIN SERPL-MCNC: 2.7 G/DL (ref 3.5–5.2)
ALBUMIN/GLOBULIN RATIO: 0.8 (ref 1–2.5)
ALP BLD-CCNC: 96 U/L (ref 35–104)
ALT SERPL-CCNC: 25 U/L (ref 5–33)
ANION GAP SERPL CALCULATED.3IONS-SCNC: 11 MMOL/L (ref 9–17)
AST SERPL-CCNC: 13 U/L
BASOPHILS # BLD: 0 % (ref 0–2)
BASOPHILS ABSOLUTE: 0.03 K/UL (ref 0–0.2)
BILIRUB SERPL-MCNC: 0.28 MG/DL (ref 0.3–1.2)
BUN BLDV-MCNC: 15 MG/DL (ref 6–20)
CALCIUM SERPL-MCNC: 8 MG/DL (ref 8.6–10.4)
CHLORIDE BLD-SCNC: 100 MMOL/L (ref 98–107)
CO2: 20 MMOL/L (ref 20–31)
CREAT SERPL-MCNC: 1.28 MG/DL (ref 0.5–0.9)
EKG ATRIAL RATE: 51 BPM
EKG P AXIS: 20 DEGREES
EKG P-R INTERVAL: 160 MS
EKG Q-T INTERVAL: 440 MS
EKG QRS DURATION: 68 MS
EKG QTC CALCULATION (BAZETT): 405 MS
EKG R AXIS: 36 DEGREES
EKG T AXIS: 66 DEGREES
EKG VENTRICULAR RATE: 51 BPM
EOSINOPHILS RELATIVE PERCENT: 0 % (ref 1–4)
GFR AFRICAN AMERICAN: 56 ML/MIN
GFR NON-AFRICAN AMERICAN: 46 ML/MIN
GFR SERPL CREATININE-BSD FRML MDRD: ABNORMAL ML/MIN/{1.73_M2}
GLUCOSE BLD-MCNC: 102 MG/DL (ref 70–99)
HCT VFR BLD CALC: 34.1 % (ref 36.3–47.1)
HEMOGLOBIN: 11 G/DL (ref 11.9–15.1)
IMMATURE GRANULOCYTES: 1 %
LYMPHOCYTES # BLD: 12 % (ref 24–43)
MCH RBC QN AUTO: 32.4 PG (ref 25.2–33.5)
MCHC RBC AUTO-ENTMCNC: 32.3 G/DL (ref 28.4–34.8)
MCV RBC AUTO: 100.6 FL (ref 82.6–102.9)
MONOCYTES # BLD: 6 % (ref 3–12)
NRBC AUTOMATED: 0 PER 100 WBC
PDW BLD-RTO: 15.1 % (ref 11.8–14.4)
PLATELET # BLD: 261 K/UL (ref 138–453)
PMV BLD AUTO: 10.7 FL (ref 8.1–13.5)
POTASSIUM SERPL-SCNC: 3.7 MMOL/L (ref 3.7–5.3)
RBC # BLD: 3.39 M/UL (ref 3.95–5.11)
RBC # BLD: ABNORMAL 10*6/UL
SEG NEUTROPHILS: 81 % (ref 36–65)
SEGMENTED NEUTROPHILS ABSOLUTE COUNT: 11.3 K/UL (ref 1.5–8.1)
SODIUM BLD-SCNC: 131 MMOL/L (ref 135–144)
TOTAL PROTEIN: 6 G/DL (ref 6.4–8.3)
WBC # BLD: 14 K/UL (ref 3.5–11.3)

## 2022-06-20 PROCEDURE — 96372 THER/PROPH/DIAG INJ SC/IM: CPT

## 2022-06-20 PROCEDURE — 36415 COLL VENOUS BLD VENIPUNCTURE: CPT

## 2022-06-20 PROCEDURE — 6370000000 HC RX 637 (ALT 250 FOR IP)

## 2022-06-20 PROCEDURE — 6360000002 HC RX W HCPCS: Performed by: STUDENT IN AN ORGANIZED HEALTH CARE EDUCATION/TRAINING PROGRAM

## 2022-06-20 PROCEDURE — 80053 COMPREHEN METABOLIC PANEL: CPT

## 2022-06-20 PROCEDURE — 93010 ELECTROCARDIOGRAM REPORT: CPT | Performed by: INTERNAL MEDICINE

## 2022-06-20 PROCEDURE — 6370000000 HC RX 637 (ALT 250 FOR IP): Performed by: STUDENT IN AN ORGANIZED HEALTH CARE EDUCATION/TRAINING PROGRAM

## 2022-06-20 PROCEDURE — 99222 1ST HOSP IP/OBS MODERATE 55: CPT | Performed by: INTERNAL MEDICINE

## 2022-06-20 PROCEDURE — 76536 US EXAM OF HEAD AND NECK: CPT

## 2022-06-20 PROCEDURE — 85025 COMPLETE CBC W/AUTO DIFF WBC: CPT

## 2022-06-20 PROCEDURE — 2580000003 HC RX 258: Performed by: STUDENT IN AN ORGANIZED HEALTH CARE EDUCATION/TRAINING PROGRAM

## 2022-06-20 PROCEDURE — 99024 POSTOP FOLLOW-UP VISIT: CPT | Performed by: OBSTETRICS & GYNECOLOGY

## 2022-06-20 PROCEDURE — 1220000000 HC SEMI PRIVATE OB R&B

## 2022-06-20 RX ORDER — GABAPENTIN 300 MG/1
300 CAPSULE ORAL 3 TIMES DAILY
Status: DISCONTINUED | OUTPATIENT
Start: 2022-06-20 | End: 2022-06-22 | Stop reason: HOSPADM

## 2022-06-20 RX ADMIN — NIFEDIPINE 120 MG: 30 TABLET, FILM COATED, EXTENDED RELEASE ORAL at 08:52

## 2022-06-20 RX ADMIN — GABAPENTIN 300 MG: 300 CAPSULE ORAL at 20:28

## 2022-06-20 RX ADMIN — CLINDAMYCIN PHOSPHATE: 10 LOTION TOPICAL at 14:53

## 2022-06-20 RX ADMIN — METRONIDAZOLE 500 MG: 500 TABLET, FILM COATED ORAL at 17:07

## 2022-06-20 RX ADMIN — METRONIDAZOLE 500 MG: 500 TABLET, FILM COATED ORAL at 02:22

## 2022-06-20 RX ADMIN — OXYCODONE 10 MG: 5 TABLET ORAL at 17:07

## 2022-06-20 RX ADMIN — CEPHALEXIN 500 MG: 500 CAPSULE ORAL at 17:08

## 2022-06-20 RX ADMIN — GABAPENTIN 300 MG: 300 CAPSULE ORAL at 08:53

## 2022-06-20 RX ADMIN — GABAPENTIN 300 MG: 300 CAPSULE ORAL at 14:52

## 2022-06-20 RX ADMIN — METRONIDAZOLE 500 MG: 500 TABLET, FILM COATED ORAL at 14:51

## 2022-06-20 RX ADMIN — SODIUM CHLORIDE, PRESERVATIVE FREE 10 ML: 5 INJECTION INTRAVENOUS at 20:28

## 2022-06-20 RX ADMIN — DOCUSATE SODIUM 100 MG: 100 CAPSULE ORAL at 00:00

## 2022-06-20 RX ADMIN — ACETAMINOPHEN 1000 MG: 500 TABLET ORAL at 17:07

## 2022-06-20 RX ADMIN — DOCUSATE SODIUM 100 MG: 100 CAPSULE ORAL at 08:53

## 2022-06-20 RX ADMIN — MAGNESIUM HYDROXIDE 30 ML: 400 SUSPENSION ORAL at 15:39

## 2022-06-20 RX ADMIN — HYDROCHLOROTHIAZIDE 25 MG: 25 TABLET ORAL at 09:00

## 2022-06-20 RX ADMIN — CEPHALEXIN 500 MG: 500 CAPSULE ORAL at 09:07

## 2022-06-20 RX ADMIN — CLINDAMYCIN PHOSPHATE: 10 LOTION TOPICAL at 09:01

## 2022-06-20 RX ADMIN — OXYCODONE 10 MG: 5 TABLET ORAL at 07:34

## 2022-06-20 RX ADMIN — HEPARIN SODIUM 7500 UNITS: 5000 INJECTION INTRAVENOUS; SUBCUTANEOUS at 00:05

## 2022-06-20 RX ADMIN — SIMETHICONE 80 MG: 80 TABLET, CHEWABLE ORAL at 20:28

## 2022-06-20 RX ADMIN — SIMETHICONE 80 MG: 80 TABLET, CHEWABLE ORAL at 14:51

## 2022-06-20 RX ADMIN — SODIUM CHLORIDE, PRESERVATIVE FREE 10 ML: 5 INJECTION INTRAVENOUS at 08:58

## 2022-06-20 RX ADMIN — HEPARIN SODIUM 7500 UNITS: 5000 INJECTION INTRAVENOUS; SUBCUTANEOUS at 11:49

## 2022-06-20 RX ADMIN — DOCUSATE SODIUM 100 MG: 100 CAPSULE ORAL at 20:28

## 2022-06-20 RX ADMIN — LABETALOL HYDROCHLORIDE 200 MG: 200 TABLET, FILM COATED ORAL at 06:28

## 2022-06-20 RX ADMIN — CEPHALEXIN 500 MG: 500 CAPSULE ORAL at 02:22

## 2022-06-20 RX ADMIN — ACETAMINOPHEN 1000 MG: 500 TABLET ORAL at 02:22

## 2022-06-20 RX ADMIN — ACETAMINOPHEN 1000 MG: 500 TABLET ORAL at 09:06

## 2022-06-20 RX ADMIN — LABETALOL HYDROCHLORIDE 200 MG: 200 TABLET, FILM COATED ORAL at 17:08

## 2022-06-20 RX ADMIN — CLINDAMYCIN PHOSPHATE: 10 LOTION TOPICAL at 20:31

## 2022-06-20 RX ADMIN — Medication 1 TABLET: at 08:52

## 2022-06-20 ASSESSMENT — PAIN SCALES - GENERAL
PAINLEVEL_OUTOF10: 3
PAINLEVEL_OUTOF10: 8
PAINLEVEL_OUTOF10: 8

## 2022-06-20 ASSESSMENT — ENCOUNTER SYMPTOMS: TACHYPNEA: 1

## 2022-06-20 ASSESSMENT — PAIN DESCRIPTION - LOCATION: LOCATION: ABDOMEN

## 2022-06-20 ASSESSMENT — PAIN DESCRIPTION - DESCRIPTORS: DESCRIPTORS: CRAMPING

## 2022-06-20 NOTE — PROGRESS NOTES
[facility]  Progress Note                   Date:   6/20/2022  Patient name: Frederick Edwards  Date of admission:  6/18/2022 12:50 PM  MRN:   [id number]  YOB: 1985  PCP: GROVER Giraldo CNP    Reason for Admission: Chronic hypertension [I10]  High-risk pregnancy in third trimester [O09.93]  Postpartum state [Z39.2]    Subjective:       Clinical Changes /Abnormalities: Stable. No new CV issues/concerns overnight. Labs, vitals, & tele reviewed. BP stable. Denies any CP. Review of Systems    Medications:   Scheduled Meds:   gabapentin  300 mg Oral TID    sodium chloride flush  5-40 mL IntraVENous 2 times per day    acetaminophen  1,000 mg Oral Q8H    docusate sodium  100 mg Oral BID    prenatal vitamin  1 tablet Oral Daily    cephALEXin  500 mg Oral 3 times per day    And    metroNIDAZOLE  500 mg Oral 3 times per day    sodium chloride  500 mL IntraVENous Once    heparin (porcine)  7,500 Units SubCUTAneous Q12H    clindamycin   Topical BID    labetalol  200 mg Oral BID    NIFEdipine  120 mg Oral Daily    hydroCHLOROthiazide  25 mg Oral Daily     Continuous Infusions:   sodium chloride      oxytocin       CBC:   Recent Labs     06/19/22  1209 06/19/22  1813 06/20/22  0648   WBC 19.4* 19.7* 14.0*   HGB 11.8* 11.7* 11.0*    260 261     BMP:    Recent Labs     06/19/22  1209 06/19/22  1813 06/20/22  0837   * 132* 131*   K 4.1 3.9 3.7    100 100   CO2 16* 18* 20   BUN 13 14 15   CREATININE 1.25* 1.23* 1.28*   GLUCOSE 136* 133* 102*     Hepatic:  Recent Labs     06/19/22  1209 06/19/22  1813 06/20/22  0837   AST 23 22 13   ALT 36* 32 25   BILITOT 0.50 0.42 0.28*   ALKPHOS 105* 104 96     Troponin:   Recent Labs     06/19/22  0634 06/19/22  1209 06/19/22  1813   TROPHS 25* 22* 21*     BNP: No results for input(s): BNP in the last 72 hours. Lipids: No results for input(s): CHOL, HDL in the last 72 hours.     Invalid input(s): LDLCALCU  INR: No results for input(s): INR in the last 72 hours. Objective:   Vitals: /71   Pulse 79   Temp 98.1 °F (36.7 °C) (Oral)   Resp 16   SpO2 95%   Breastfeeding Unknown   General appearance: alert and cooperative with exam  HEENT: Head: Normocephalic, no lesions, without obvious abnormality. Neck:no JVD, trachea midline, no adenopathy  Lungs: Clear to auscultation  Heart: Regular rate and rhythm, s1/s2 auscultated, no murmurs  Abdomen: soft, non-tender, bowel sounds active  Extremities: no edema  Neurologic: not done    Echo 22  Summary  Left ventricle is normal in size. Global left ventricular systolic function  is normal. Calculated ejection fraction 58% by Barba's method. Mild tricuspid regurgitation. Assessment / Acute Cardiac Problems:   1. HTN urgency  2. [id number] POD # 2 s/p RLTCS  3. Thyroid nodule  4. Lung nodule  5. Tobacco abuse    Patient Active Problem List:     History of  x2     Chronic hypertension (meds)     History of indicated  delivery     History of pre-eclampsia x2     + HPV      AMA       Late prenatal care     History Fetal growth restriction      THC Use      Tobacco use     Anemia affecting pregnancy in second trimester     RLTCS 22 F APG 2//7 Wt 1#12     cHTN (meds) w/ HARJINDER w/ SF (G3)     Pulmonary edema (G3)     Thyroid nodule     Pulmonary hypertension (G3)     Lung nodule     High-risk pregnancy in third trimester     Postpartum state      Plan of Treatment:   1. Stable. BP much improved and WNL. Continue PO HCTZ, Labetalol, & Nifedipine. Ambulatory BP monitoring upon discharge recommended  2. Echo reviewed. Normal LVEF. No further CV work-up at this time. Please call with further questions/concerns.      Electronically signed by GROVER Jenkins CNP on 2022 at 11:20 AM  [address]  [phone]

## 2022-06-20 NOTE — LACTATION NOTE
Mom getting ready to pump, reviewed pump settings and frequency. Encouraged to call out for assistance as needed.

## 2022-06-20 NOTE — TELEPHONE ENCOUNTER
----- Message from Chelsey Freeman sent at 6/20/2022  7:03 AM EDT -----  Azeem Genao, please see message from Dr Lance Lawrence and call to schedule. Thank you.   ----- Message -----  From: Julia Mccullough MD  Sent: 6/19/2022   4:13 PM EDT  To: [facility] Respiratory Spec Clinical Staff     Please have the patient follow-up with us in the office for pulmonary artery hypertension and possible sleep apnea and also having a lung nodule.   Thank you

## 2022-06-20 NOTE — PROGRESS NOTES
POST OPERATIVE DAY # 2    [facility] is a 42 y.o. female   This patient was seen and examined today. RLTCS on 22    Her pregnancy was complicated by:   Patient Active Problem List   Diagnosis    History of  x2    Chronic hypertension (meds)    History of indicated  delivery    History of pre-eclampsia x2    + HPV     AMA      Late prenatal care    History Fetal growth restriction     THC Use     Tobacco use    Anemia affecting pregnancy in second trimester    RLTCS 22 F APG  Wt 1#12    cHTN (meds) w/ HARJINDER w/ SF (G3)    Pulmonary edema (G3)    Thyroid nodule    Pulmonary hypertension (G3)    Lung nodule    High-risk pregnancy in third trimester    Postpartum state       Today she is doing well without any chief complaint. Her lochia is light. She denies chest pain, shortness of breath, headache, lightheadedness, blurred vision and peripheral edema. She is breast pumping and she denies any signs or symptoms of mastitis. She is ambulating well. She is voiding without difficulty. She currently denies S/S of postpartum depression. Flatus absent. Bowel movement absent. She is tolerating solids.     Vital Signs:  Vitals:    22 2301 22 0001 22 0101 22 0201   BP: 136/81 139/81 126/73 139/83   Pulse: 68 68 70 66   Resp:       Temp:       TempSrc:       SpO2:            Urine Input & Output last 24hrs:     Intake/Output Summary (Last 24 hours) at 2022 0532  Last data filed at 2022 2301  Gross per 24 hour   Intake 1238 ml   Output 2075 ml   Net -837 ml       Physical Exam:  General:  no apparent distress, alert and cooperative  Neurologic:  alert, oriented, normal speech, no focal findings or movement disorder noted  Lungs:  No increased work of breathing, good air exchange, clear to auscultation bilaterally, no crackles or wheezing  Heart:  Regular rate and rhythm, and no murmur noted    Abdomen: abdomen soft, non-distended, non-tender, bowel sounds present  Fundus: non-tender, firm, below umbilicus  Incision: Prevena in place and functioning. Some small amount of drainage from suspected HS lesion noted in middle of dressing, will dry and change, currently functioning appropriately  Extremities:  no calf tenderness, non edematous    Labs:  Lab Results   Component Value Date    WBC 19.7 (H) 06/19/2022    HGB 11.7 (L) 06/19/2022    HCT 36.1 (L) 06/19/2022    MCV 97.8 06/19/2022     06/19/2022       Assessment/Plan:  1. Candis Chong is a [id number] POD # 2 s/p RLTCS   - Doing well, vitals improving postpartum   - Female infant in NICU   - Encourage ambulation and use of incentive spirometer   - S/p de la cruz catheter and saline lock IV   - Trending CBC and CMP, next this AM   - Tylenol/Therese/Gabapentin for pain   - Heparin 7500 BID for DVT prophylaxis   - Keflex/Flagyl x48h postpartum  2. Rh positive/Rubella immune  3. Breast pumping   - Denies s/s mastitis  4. cHTN w/ HARJINDER w/ SF   - S/p Mag x24h postpartum   - Patient was prevoiusly on labetalol 200mg BID, continued during admission   - Titrate BP medication as needed. Also currently on Procardia 120XL daily and HCTZ 25mg daily   - Patient currently denies s/s preE   - PreE labs trending, P/C of 39.54 on 6/18, increased from 0.10 on 4/7/22   - Pt last required IV medication yesterday @1230   - CBC and CMP ordered for this AM    - ALT/AST 46/29>43/35>38/23>36/23>32/22     - Cr 1.15>1.00>1.29>1.25>1.23    - CTA Head negative for intracranial abnormality on 6/18/22  5. Pulmonary Hypertension/Pulmonary Edema   - Noted on admission   - Cardiology following, telemetry ordered   - BNP 1020>8677>9512>5368    - Trop 20>18>25>22>21    - Echo completed 6/18/22 with EF of 58%, mild pulmonary HTN noted   - CTPE on 6/18 reveals mild pulmonary edema with trace bilateral pleural effusions and mild dilation of the main pulmonary artery   - EKG 6/18/22 reveals sinus bradycardia which has improved  6.  Thyroid Nodule   - Incidental finding on CTPE   - Thyroid studies wnl on admission   - Internal medicine consultation  7. Lung Nodule   - Incidental finding on CTPE   - Internal medicine consultation  8. Hydradenitis Suppurativa   - Pt with extensive lesions   - Continue topical Clinda, Rx provided on d/c  9. Hx Abnormal Pap   - Pap normal, HPV+ 2022   - Follow up in 1 year  10. Tobacco Use   - Encourage cessation  11. THC Use   - UDS positive    - SW consult ordered   - Encourage cessation  12. BMI 42  13. Continue post-op care. Counseling Completed:  Secondary Smoke risks and Sudden Infant Death Syndrome were reviewed with recommendations. Infant sleeping, \"back to sleep\" and avoidance of co-sleeping recommendations were reviewed. Signs and Symptoms of Post Partum Depression were reviewed. The patient is to call if any occur. Signs and symptoms of Mastitis were reviewed. The patient is to call if any occur for follow up. Discharge instructions including pelvic rest, incision care, 15 lb weight restriction, no driving with pain medicine and office follow-up were reviewed with patient     Attending Physician: Dr. Horace Mcmullen DO  Ob/Gyn Resident  2022, 5:32 AM      Date: 2022  Time: 11:06 AM      Patient Name: Yancy Skinner  Patient : 1985  Room/Bed: 6479/6202-73  Admission Date/Time: 2022 12:50 PM        Attending Physician Statement  I have personally seen, evaluated and discussed the care of Yancy Skinner, including pertinent history and exam findings with the resident. I have reviewed and edited their note in the electronic medical record. The key elements of all parts of the encounter have been performed/reviewed by me. I agree with the assessment, plan and orders as documented by the resident. The level of care submitted represents to the best of my ability the care documented in the medical record today. GC Modifier.   This service has been performed in part by a resident under the direction of a teaching physician. Attending's Name:  Natalia Sarabia MD        Patient reports she feels well. Denies shortness of breath, chest pain, no RUQ pain, no vision changes. Thyroid US completed recommend FNA. Will coordinate for outpatient follow up. Blood pressure remains stable cardiology signed off and did not recommend further cardiac work up at this time. She continues to improve. Will refer for outpatient evaluation for incidental lung nodule as well. Continue close monitoring, will repeat labs in the AM and continue blood pressure management. Creatinine remains elevated at 1.28 from peek on 6/19 of 1.29.

## 2022-06-20 NOTE — CARE COORDINATION
CASE MANAGEMENT POST-PARTUM TRANSITIONAL CARE PLAN    Chronic hypertension [I10]  High-risk pregnancy in third trimester [O09.93]  Postpartum state [Z39.2]    Writer met w/ [facility] at bedside to discuss DCP. She is S/P CS @27w5d on 22 of female . Writer verified name/address/phone number correct on facesheet. She states she lives with 2 kids. [facility] verbalized no problems with transportation to and from doctors appointments or with paying for medications upon discharge home. [employer] insurance correct. Writer notified [facility] she has 30 days from date of birth to add  to insurance policy. [facility] verbalized understanding. Infant name on BC: Ugo Space   Infant PCP Nisha Mejia. DME: BP Cuff, Wrist braces  HOME CARE: None    Anticipate DC of mother 2022.     Readmission Risk              Risk of Unplanned Readmission:  20

## 2022-06-20 NOTE — FLOWSHEET NOTE
Pt admitted to room 749 . Assessment completed and charted. Plan of care discussed. Room and call light orientation completed. Unit policies explained. Physician orders reviewed and noted.

## 2022-06-20 NOTE — PROGRESS NOTES
Resident Interval Magnesium Sulfate Note    Lian Madison is a 42 y.o. female [id number] PPD# 1 s/p RLTCS on 6/18/22  The patient is resting comfortably. She denies headache, visual changes, abdominal pain in the right upper quadrant, nausea/vomiting, backache and dysuria. She denies any shortness of breath or chest pain. She denies change in her extremities, regarding swelling. Continuous Medications:    sodium chloride      oxytocin         Vitals:    Vitals:    06/19/22 2200 06/19/22 2201 06/19/22 2205 06/19/22 2301   BP:  132/79  136/81   Pulse:  67  68   Resp:  18     Temp:       TempSrc:       SpO2: 95%  95%      Physical Exam:  Chest: clear to auscultation bilaterally  Heart: RRR no murmur  Abdomen: soft, nontender, nondistended  Extremities: DTR normal Right: 2/4   Left: 2/4  Clonus: absent    Urine Output: 75 ml/hr; Clear and Yellow urine    Labs:  Last Magnesium Level:   Lab Results   Component Value Date    MG 6.9 06/19/2022       BMP:    Recent Labs     06/19/22  0634 06/19/22  1209 06/19/22  1813   * 132* 132*   K 3.8 4.1 3.9   CL 99 100 100   CO2 17* 16* 18*   BUN 12 13 14   CREATININE 1.29* 1.25* 1.23*   GLUCOSE 126* 136* 133*       ASSESSMENT/PLAN  Lian Madison is a 42 y.o. female [id number]  PPD# 1 s/p RLTCS on 6/18/22 with a dx of cHTN (meds) w/ HARJINDER w/ SF (BP, HA, pulm edema)               - Continue Magnesium Sulfate Treatment 1g/hr, off @ 2350 on 6/19/22              - Mag levels q6hrs per provider. Last Mag level was 6.9              - BPs elevated but non severe since 1457 on 6/19/22              - Patient received a total of Procardia 120 XL today (90 XL then an additional 30 XL). Will initiate 120 XL qd from 6/20/22.  Continue with labetalol 200 TID               - Patient denies any s/s PreE              - PreE labs abnl, P/C 39.54 (6/18)               - ALT/AST trend 46/29>43/35>38/23>36/23>32/22               - BNP improving 1578>1904>1649>1356               - Trop stable: 20>18>25>22>21               - Cr improving, trend: 1.15>1>1.29>1.25>1.23               - UOP adequate              - Mag discontinued         Marisol Rosado MD  Ob/Gyn Resident  6/19/2022, 11:49 PM

## 2022-06-20 NOTE — CONSULTS
[address] [facility]     Department of Internal Medicine - Staff Internal Medicine Teaching Service          ADMISSION NOTE/HISTORY AND PHYSICAL EXAMINATION   Date: 6/20/2022  Patient Name: Thelma Bhatt  Date of admission: 6/18/2022 12:50 PM  YOB: 1985  PCP: GROVER Avila CNP  History Obtained From: chart review and patient    Consult Reason:     Consult Reason: Management of pulmonary and thyroid nodule    HISTORY OF PRESENTING ILLNESS     The patient is a 42 y.o. female presents with a chief complaint of abdominal pain chest pain and was found to have hypertensive urgency. Patient was 27 weeks pregnant at the time of presentation to ED. Patient has underwent emergency C section  and was transferred to MICU for further management of hypertensive urgency that was resistant to several medications.     Internal medicine has been consulted for the management of 2.9 cm thyroid nodule, mildly enlarged bilateral axillary lymph nodes, probably reactive, solid right pulmonary nodule measuring 5 mm.       PMH:  Chronic hypertension,  History of preeclampsia  Pulmonary hypertension  Morbid obesity  Tobacco use  Marijuana use     On evaluation, patient has been afebrile, mildly hypertensive with /86, heart rate 68, and saturating well on room air. Relevant labs include sodium 131, potassium 3.7, creatinine 1.28, glucose 102,    TSH 0.22, free thyroxine 1.05    WBC 14, hemoglobin 11, platelet count 690      Thyroid ultrasound:    Enlarged heterogeneous multinodular gland.       There are bilateral TR4 and TR3 nodules which meet TI-RADS criteria for an   attempt at ultrasound-guided FNA biopsy.  Based on ACR/TI-RADS   recommendations, ultrasound-guided FNA biopsy of the 2 largest nodules in the   left lobe is suggested with 1 year follow-up for additional nodules.            Review of Systems:  General ROS: Completed and except as mentioned above were negative   HEENT ROS: Completed and except as mentioned above were negative   Allergy and Immunology ROS:  Completed and except as mentioned above were negative  Hematological and Lymphatic ROS:  Completed and except as mentioned above were negative  Respiratory ROS:  Completed and except as mentioned above were negative  Cardiovascular ROS:  Completed and except as mentioned above were negative  Gastrointestinal ROS: Completed and except as mentioned above were negative  Genito-Urinary ROS:  Completed and except as mentioned above were negative  Musculoskeletal ROS:  Completed and except as mentioned above were negative  Neurological ROS:  Completed and except as mentioned above were negative  Skin & Dermatological ROS:  Completed and except as mentioned above were negative  Psychological ROS:  Completed and except as mentioned above were negative    PAST MEDICAL HISTORY     Past Medical History:   Diagnosis Date    Chronic hypertension (meds) 2022    Taking labetalol 200 mg TID    History of  x2 2022    History of pre-eclampsia x2 2022    Hypertension        PAST SURGICAL HISTORY     Past Surgical History:   Procedure Laterality Date     SECTION      x2     SECTION N/A 2022     [facility]       ALLERGIES     Asa [aspirin], Bee pollen, Dust mite extract, Potassium-containing compounds, Shellfish-derived products, Strawberry extract, and Flour    MEDICATIONS PRIOR TO ADMISSION     Prior to Admission medications    Medication Sig Start Date End Date Taking? Authorizing Provider   acetaminophen (TYLENOL) 500 MG tablet Take 2 tablets by mouth every 6 hours as needed for Pain 22  Yes Chetan Benitez DO   oxyCODONE (ROXICODONE) 5 MG immediate release tablet Take 1 tablet by mouth every 6 hours as needed for Pain for up to 5 days. Intended supply: 5 days.  Take lowest dose possible to manage pain 22 Yes Chetan Benitez DO docusate sodium (COLACE) 100 MG capsule Take 1 capsule by mouth 2 times daily as needed for Constipation 22 Yes Bianca Cea, DO   clindamycin (CLEOCIN-T) 1 % gel Apply topically 2 times daily. 22 Yes Bianca Cea, DO   calcium carbonate (OS-BAR) 1250 (500 Ca) MG chewable tablet Take 200 mg by mouth daily 3/23/22  Yes Historical Provider, MD   cetirizine (ZYRTEC) 10 MG tablet Take 10 mg by mouth nightly 3/23/22  Yes Historical Provider, MD   famotidine (PEPCID) 20 MG tablet Take 20 mg by mouth 2 times daily 3/24/22  Yes Historical Provider, MD   pyridoxine (B-6) 50 MG tablet Take 25 mg by mouth daily 3/23/22  Yes Historical Provider, MD   diphenhydrAMINE (BENADRYL) 25 MG capsule Take 25 mg by mouth every 6 hours as needed    Historical Provider, MD   lidocaine (LIDODERM) 5 % Place 1 patch onto the skin daily 12 hours on, 12 hours off. 21   Pushpa Frye MD       SOCIAL HISTORY     Tobacco: Patient reports that she has been smoking cigarettes from age 7. Patient reports smoking 0.25 packs/day  Alcohol: Reports previous alcohol use  Illicits: Reports current drug use-marijuana  Occupation: Not on file    FAMILY HISTORY     No family history on file. PHYSICAL EXAM     Vitals: BP (!) 143/86   Pulse 68   Temp 98.6 °F (37 °C)   Resp 21   SpO2 95%   Breastfeeding Unknown   Tmax: Temp (24hrs), Av.3 °F (36.8 °C), Min:97.5 °F (36.4 °C), Max:98.8 °F (37.1 °C)    Last Body weight:   Wt Readings from Last 3 Encounters:   22 265 lb (120.2 kg)   21 280 lb (127 kg)     Body Mass Index : There is no height or weight on file to calculate BMI. PHYSICAL EXAMINATION:  Physical Exam  Constitutional:       General: She is not in acute distress. Appearance: She is obese. She is not ill-appearing. HENT:      Head: Normocephalic. Nose: No rhinorrhea.    Eyes:      Conjunctiva/sclera: Conjunctivae normal.   Cardiovascular:      Rate and Rhythm: Normal rate and regular rhythm. Pulses: Normal pulses. Heart sounds: Murmur heard. Pulmonary:      Effort: Pulmonary effort is normal. No respiratory distress. Breath sounds: Normal breath sounds. No stridor. No wheezing. Abdominal:      General: Bowel sounds are normal. There is no distension. Palpations: Abdomen is soft. Tenderness: There is no abdominal tenderness. There is no guarding. Musculoskeletal:      Right lower leg: Edema present. Left lower leg: Edema present. Neurological:      General: No focal deficit present. Mental Status: She is alert and oriented to person, place, and time. Mental status is at baseline.    Psychiatric:         Mood and Affect: Mood normal.         Behavior: Behavior normal.             INVESTIGATIONS     Laboratory Testing:     Recent Results (from the past 24 hour(s))   Magnesium    Collection Time: 06/19/22  3:34 PM   Result Value Ref Range    Magnesium 7.2 (HH) 1.6 - 2.6 mg/dL   Magnesium    Collection Time: 06/19/22  6:13 PM   Result Value Ref Range    Magnesium 6.9 (HH) 1.6 - 2.6 mg/dL   CBC    Collection Time: 06/19/22  6:13 PM   Result Value Ref Range    WBC 19.7 (H) 3.5 - 11.3 k/uL    RBC 3.69 (L) 3.95 - 5.11 m/uL    Hemoglobin 11.7 (L) 11.9 - 15.1 g/dL    Hematocrit 36.1 (L) 36.3 - 47.1 %    MCV 97.8 82.6 - 102.9 fL    MCH 31.7 25.2 - 33.5 pg    MCHC 32.4 28.4 - 34.8 g/dL    RDW 14.9 (H) 11.8 - 14.4 %    Platelets 466 128 - 819 k/uL    MPV 10.0 8.1 - 13.5 fL    NRBC Automated 0.0 0.0 per 100 WBC   Comprehensive Metabolic Panel    Collection Time: 06/19/22  6:13 PM   Result Value Ref Range    Glucose 133 (H) 70 - 99 mg/dL    BUN 14 6 - 20 mg/dL    CREATININE 1.23 (H) 0.50 - 0.90 mg/dL    Calcium 8.1 (L) 8.6 - 10.4 mg/dL    Sodium 132 (L) 135 - 144 mmol/L    Potassium 3.9 3.7 - 5.3 mmol/L    Chloride 100 98 - 107 mmol/L    CO2 18 (L) 20 - 31 mmol/L    Anion Gap 14 9 - 17 mmol/L    Alkaline Phosphatase 104 35 - 104 U/L    ALT 32 5 - 33 U/L AST 22 <32 U/L    Total Bilirubin 0.42 0.3 - 1.2 mg/dL    Total Protein 6.7 6.4 - 8.3 g/dL    Albumin 2.9 (L) 3.5 - 5.2 g/dL    Albumin/Globulin Ratio 0.8 (L) 1.0 - 2.5    GFR Non- 49 (L) >60 mL/min    GFR  59 (L) >60 mL/min    GFR Comment         Brain Natriuretic Peptide    Collection Time: 06/19/22  6:13 PM   Result Value Ref Range    Pro-BNP 1,356 (H) <300 pg/mL   Troponin    Collection Time: 06/19/22  6:13 PM   Result Value Ref Range    Troponin, High Sensitivity 21 (H) 0 - 14 ng/L   Magnesium    Collection Time: 06/19/22 10:11 PM   Result Value Ref Range    Magnesium 6.9 (HH) 1.6 - 2.6 mg/dL   CBC with Auto Differential    Collection Time: 06/20/22  6:48 AM   Result Value Ref Range    WBC 14.0 (H) 3.5 - 11.3 k/uL    RBC 3.39 (L) 3.95 - 5.11 m/uL    Hemoglobin 11.0 (L) 11.9 - 15.1 g/dL    Hematocrit 34.1 (L) 36.3 - 47.1 %    .6 82.6 - 102.9 fL    MCH 32.4 25.2 - 33.5 pg    MCHC 32.3 28.4 - 34.8 g/dL    RDW 15.1 (H) 11.8 - 14.4 %    Platelets 980 636 - 702 k/uL    MPV 10.7 8.1 - 13.5 fL    NRBC Automated 0.0 0.0 per 100 WBC    Seg Neutrophils 81 (H) 36 - 65 %    Lymphocytes 12 (L) 24 - 43 %    Monocytes 6 3 - 12 %    Eosinophils % 0 (L) 1 - 4 %    Basophils 0 0 - 2 %    Immature Granulocytes 1 (H) 0 %    Segs Absolute 11.30 (H) 1.50 - 8.10 k/uL    Absolute Lymph # 1.69 1.10 - 3.70 k/uL    Absolute Mono # 0.86 0.10 - 1.20 k/uL    Absolute Eos # 0.04 0.00 - 0.44 k/uL    Basophils Absolute 0.03 0.00 - 0.20 k/uL    Absolute Immature Granulocyte 0.08 0.00 - 0.30 k/uL    RBC Morphology ANISOCYTOSIS PRESENT    Comprehensive Metabolic Panel    Collection Time: 06/20/22  8:37 AM   Result Value Ref Range    Glucose 102 (H) 70 - 99 mg/dL    BUN 15 6 - 20 mg/dL    CREATININE 1.28 (H) 0.50 - 0.90 mg/dL    Calcium 8.0 (L) 8.6 - 10.4 mg/dL    Sodium 131 (L) 135 - 144 mmol/L    Potassium 3.7 3.7 - 5.3 mmol/L    Chloride 100 98 - 107 mmol/L    CO2 20 20 - 31 mmol/L    Anion Gap 11 9 - 17 mmol/L    Alkaline Phosphatase 96 35 - 104 U/L    ALT 25 5 - 33 U/L    AST 13 <32 U/L    Total Bilirubin 0.28 (L) 0.3 - 1.2 mg/dL    Total Protein 6.0 (L) 6.4 - 8.3 g/dL    Albumin 2.7 (L) 3.5 - 5.2 g/dL    Albumin/Globulin Ratio 0.8 (L) 1.0 - 2.5    GFR Non- 46 (L) >60 mL/min    GFR  56 (L) >60 mL/min    GFR Comment             Imaging:   CT HEAD WO CONTRAST    Result Date: 6/18/2022  No acute intracranial abnormality visualized. Normal appearing [id number]. RECOMMENDATIONS: Unavailable     US HEAD NECK SOFT TISSUE THYROID    Result Date: 6/20/2022  Enlarged heterogeneous multinodular gland. There are bilateral TR4 and TR3 nodules which meet TI-RADS criteria for an attempt at ultrasound-guided FNA biopsy. Based on ACR/TI-RADS recommendations, ultrasound-guided FNA biopsy of the 2 largest nodules in the left lobe is suggested with 1 year follow-up for additional nodules. RECOMMENDATIONS: NODULE 1:  ACR TI-RADS TR4: Recommend:  Ultrasound-guided fine needle aspiration. NODULE 2:  ACR TI-RADS TR4: Recommend:  Ultrasound-guided fine needle aspiration. NODULE 3:  ACR TI-RADS TR3: Recommend:  Ultrasound-guided fine needle aspiration. NODULE 4:  ACR TI-RADS TR4: Recommend:  Ultrasound-guided fine needle aspiration. ACR TI-RADS recommendations: TR5 (>= 7 points):  FNA if >= 1 cm; follow-up if 0.5-0.9 cm in 1, 2, 3, 4, and 5 years TR4 (4-6 points):  FNA if >= 1.5 cm; follow-up if 1.0-1.4 cm in 1, 2, 3, and 5 years TR3 (3 points):  FNA if >= 2.5 cm; follow-up if 1.5-2.4 cm in 1, 3, and 5 years TR2 (2 points):  No FNA or follow-up TR1 (0 points):  No FNA or follow-up ACR TI-RADS recommends that no more than two nodules with the highest ACR TI-RADS point total should be biopsied and no more than four nodules should be followed. CT CHEST PULMONARY EMBOLISM W CONTRAST    Result Date: 6/18/2022  1. No evidence of pulmonary embolism.  2. Mild pulmonary edema with trace bilateral pleural [address]; [place], [address] [facility] [address]  6/20/2022, 12:17 PM   Attending Physician Statement  I have discussed the care of Toni Cali, including pertinent history and exam findings,  with the resident. I have seen and examined the patient and the key elements of all parts of the encounter have been performed by me. I agree with the assessment, plan and orders as documented by the resident with additions . Treatment plan Discussed with nursing staff in detail , all questions answered . Electronically signed by Viry Cameron MD on   6/20/22 at 10:55 PM EDT    Please note that this chart was generated using voice recognition Dragon dictation software. Although every effort was made to ensure the accuracy of this automated transcription, some errors in transcription may have occurred.

## 2022-06-21 LAB
ABSOLUTE EOS #: 0.12 K/UL (ref 0–0.44)
ABSOLUTE IMMATURE GRANULOCYTE: 0.05 K/UL (ref 0–0.3)
ABSOLUTE LYMPH #: 2.12 K/UL (ref 1.1–3.7)
ABSOLUTE MONO #: 0.77 K/UL (ref 0.1–1.2)
ALBUMIN SERPL-MCNC: 2.4 G/DL (ref 3.5–5.2)
ALBUMIN/GLOBULIN RATIO: 0.7 (ref 1–2.5)
ALP BLD-CCNC: 94 U/L (ref 35–104)
ALT SERPL-CCNC: 18 U/L (ref 5–33)
ANION GAP SERPL CALCULATED.3IONS-SCNC: 13 MMOL/L (ref 9–17)
AST SERPL-CCNC: 12 U/L
BASOPHILS # BLD: 0 % (ref 0–2)
BASOPHILS ABSOLUTE: <0.03 K/UL (ref 0–0.2)
BILIRUB SERPL-MCNC: 0.33 MG/DL (ref 0.3–1.2)
BUN BLDV-MCNC: 18 MG/DL (ref 6–20)
CALCIUM SERPL-MCNC: 8.1 MG/DL (ref 8.6–10.4)
CHLORIDE BLD-SCNC: 98 MMOL/L (ref 98–107)
CO2: 19 MMOL/L (ref 20–31)
CREAT SERPL-MCNC: 1.17 MG/DL (ref 0.5–0.9)
CULTURE: NORMAL
EOSINOPHILS RELATIVE PERCENT: 1 % (ref 1–4)
GFR AFRICAN AMERICAN: >60 ML/MIN
GFR NON-AFRICAN AMERICAN: 51 ML/MIN
GFR SERPL CREATININE-BSD FRML MDRD: ABNORMAL ML/MIN/{1.73_M2}
GLUCOSE BLD-MCNC: 83 MG/DL (ref 70–99)
HCT VFR BLD CALC: 33.8 % (ref 36.3–47.1)
HEMOGLOBIN: 11.5 G/DL (ref 11.9–15.1)
IMMATURE GRANULOCYTES: 1 %
LYMPHOCYTES # BLD: 20 % (ref 24–43)
MCH RBC QN AUTO: 32 PG (ref 25.2–33.5)
MCHC RBC AUTO-ENTMCNC: 34 G/DL (ref 28.4–34.8)
MCV RBC AUTO: 94.2 FL (ref 82.6–102.9)
MONOCYTES # BLD: 7 % (ref 3–12)
NRBC AUTOMATED: 0 PER 100 WBC
PDW BLD-RTO: 15 % (ref 11.8–14.4)
PLATELET # BLD: 262 K/UL (ref 138–453)
PMV BLD AUTO: 9.8 FL (ref 8.1–13.5)
POTASSIUM SERPL-SCNC: 3.6 MMOL/L (ref 3.7–5.3)
RBC # BLD: 3.59 M/UL (ref 3.95–5.11)
RBC # BLD: ABNORMAL 10*6/UL
SEG NEUTROPHILS: 71 % (ref 36–65)
SEGMENTED NEUTROPHILS ABSOLUTE COUNT: 7.52 K/UL (ref 1.5–8.1)
SODIUM BLD-SCNC: 130 MMOL/L (ref 135–144)
SPECIMEN DESCRIPTION: NORMAL
SURGICAL PATHOLOGY REPORT: NORMAL
TOTAL PROTEIN: 6 G/DL (ref 6.4–8.3)
WBC # BLD: 10.6 K/UL (ref 3.5–11.3)

## 2022-06-21 PROCEDURE — 6370000000 HC RX 637 (ALT 250 FOR IP): Performed by: STUDENT IN AN ORGANIZED HEALTH CARE EDUCATION/TRAINING PROGRAM

## 2022-06-21 PROCEDURE — 6360000002 HC RX W HCPCS: Performed by: STUDENT IN AN ORGANIZED HEALTH CARE EDUCATION/TRAINING PROGRAM

## 2022-06-21 PROCEDURE — 1220000000 HC SEMI PRIVATE OB R&B

## 2022-06-21 PROCEDURE — 85025 COMPLETE CBC W/AUTO DIFF WBC: CPT

## 2022-06-21 PROCEDURE — 99024 POSTOP FOLLOW-UP VISIT: CPT | Performed by: OBSTETRICS & GYNECOLOGY

## 2022-06-21 PROCEDURE — 6370000000 HC RX 637 (ALT 250 FOR IP)

## 2022-06-21 PROCEDURE — 80053 COMPREHEN METABOLIC PANEL: CPT

## 2022-06-21 PROCEDURE — 2580000003 HC RX 258: Performed by: STUDENT IN AN ORGANIZED HEALTH CARE EDUCATION/TRAINING PROGRAM

## 2022-06-21 PROCEDURE — 99233 SBSQ HOSP IP/OBS HIGH 50: CPT | Performed by: INTERNAL MEDICINE

## 2022-06-21 PROCEDURE — 36415 COLL VENOUS BLD VENIPUNCTURE: CPT

## 2022-06-21 RX ORDER — CALCIUM CARBONATE 200(500)MG
1000 TABLET,CHEWABLE ORAL 3 TIMES DAILY PRN
Status: DISCONTINUED | OUTPATIENT
Start: 2022-06-21 | End: 2022-06-22 | Stop reason: HOSPADM

## 2022-06-21 RX ORDER — MEDROXYPROGESTERONE ACETATE 150 MG/ML
150 INJECTION, SUSPENSION INTRAMUSCULAR
Status: DISCONTINUED | OUTPATIENT
Start: 2022-06-21 | End: 2022-06-22 | Stop reason: HOSPADM

## 2022-06-21 RX ADMIN — DOCUSATE SODIUM 100 MG: 100 CAPSULE ORAL at 21:53

## 2022-06-21 RX ADMIN — LABETALOL HYDROCHLORIDE 200 MG: 200 TABLET, FILM COATED ORAL at 17:36

## 2022-06-21 RX ADMIN — Medication 1 TABLET: at 08:43

## 2022-06-21 RX ADMIN — ANTACID TABLETS 1000 MG: 500 TABLET, CHEWABLE ORAL at 00:09

## 2022-06-21 RX ADMIN — CLINDAMYCIN PHOSPHATE: 10 LOTION TOPICAL at 11:43

## 2022-06-21 RX ADMIN — ACETAMINOPHEN 1000 MG: 500 TABLET ORAL at 15:35

## 2022-06-21 RX ADMIN — LABETALOL HYDROCHLORIDE 200 MG: 200 TABLET, FILM COATED ORAL at 06:04

## 2022-06-21 RX ADMIN — SIMETHICONE 80 MG: 80 TABLET, CHEWABLE ORAL at 06:04

## 2022-06-21 RX ADMIN — HYDROCHLOROTHIAZIDE 25 MG: 25 TABLET ORAL at 10:36

## 2022-06-21 RX ADMIN — OXYCODONE 10 MG: 5 TABLET ORAL at 06:04

## 2022-06-21 RX ADMIN — HEPARIN SODIUM 7500 UNITS: 5000 INJECTION INTRAVENOUS; SUBCUTANEOUS at 11:43

## 2022-06-21 RX ADMIN — DOCUSATE SODIUM 100 MG: 100 CAPSULE ORAL at 08:43

## 2022-06-21 RX ADMIN — ACETAMINOPHEN 1000 MG: 500 TABLET ORAL at 21:53

## 2022-06-21 RX ADMIN — METRONIDAZOLE 500 MG: 500 TABLET, FILM COATED ORAL at 00:09

## 2022-06-21 RX ADMIN — SODIUM CHLORIDE, PRESERVATIVE FREE 10 ML: 5 INJECTION INTRAVENOUS at 15:36

## 2022-06-21 RX ADMIN — GABAPENTIN 300 MG: 300 CAPSULE ORAL at 08:43

## 2022-06-21 RX ADMIN — CEPHALEXIN 500 MG: 500 CAPSULE ORAL at 08:43

## 2022-06-21 RX ADMIN — GABAPENTIN 300 MG: 300 CAPSULE ORAL at 21:53

## 2022-06-21 RX ADMIN — GABAPENTIN 300 MG: 300 CAPSULE ORAL at 15:35

## 2022-06-21 RX ADMIN — NIFEDIPINE 120 MG: 30 TABLET, FILM COATED, EXTENDED RELEASE ORAL at 08:43

## 2022-06-21 RX ADMIN — HEPARIN SODIUM 7500 UNITS: 5000 INJECTION INTRAVENOUS; SUBCUTANEOUS at 00:10

## 2022-06-21 RX ADMIN — OXYCODONE 10 MG: 5 TABLET ORAL at 21:53

## 2022-06-21 RX ADMIN — CEPHALEXIN 500 MG: 500 CAPSULE ORAL at 00:09

## 2022-06-21 RX ADMIN — METRONIDAZOLE 500 MG: 500 TABLET, FILM COATED ORAL at 15:35

## 2022-06-21 RX ADMIN — OXYCODONE 10 MG: 5 TABLET ORAL at 15:36

## 2022-06-21 RX ADMIN — ACETAMINOPHEN 1000 MG: 500 TABLET ORAL at 06:04

## 2022-06-21 RX ADMIN — OXYCODONE 5 MG: 5 TABLET ORAL at 00:09

## 2022-06-21 ASSESSMENT — PAIN SCALES - GENERAL
PAINLEVEL_OUTOF10: 6
PAINLEVEL_OUTOF10: 8
PAINLEVEL_OUTOF10: 0
PAINLEVEL_OUTOF10: 7
PAINLEVEL_OUTOF10: 8

## 2022-06-21 NOTE — CARE COORDINATION
Social Work    Sw consulted for:  insufficient prenatal care, pre-term delivery, THC use. Baby is in NICU currently GA 28w1d. Sw met with mom in her hospital room to assess needs. Mom reports she is feeling ok and denied any current s/s of anxiety or depression. Mom aware to reach out to her dr [facility]) if any s/s arise from OP. Mom reports a good support system that includes her mom, fob, neighbors, brothers, 2 sisters, and friends. Mom reports she resides with her 2 sons (8, 4), and her mother is also staying with them. Mom reports she also has a 21year old daughter and a almost 3year old grandson. Mom reports she has a carseat, clothes, and baby basics, but does state she needs a safe place for baby to sleep. Mom states she was supposed to be linked with [facility], but states she has not ever heard from them. Mom reports limited pnc due to transportation issues, states she does not have her medicaid card, and does not know number to call for medicaid cab. Sw to provide mom [employer]'s number, medicaid cab number (and her member ID number) and low BW SSI information via text from work cell phone. This allows mom to easily respond to Sw if any barriers with resources arise. Mom reports ped will be Dr. Shey Kinsey and mom denied any barrier to appts or visiting the NICU as long as she can utilize medicaid cab. Sw discussed [de-identified] Mandate with mom who was understanding. [facility] referral made to jocelyne Lang). No other known concerns, baby is cleared to dc home with mom.

## 2022-06-21 NOTE — PROGRESS NOTES
[facility]  Internal Medicine Teaching Residency Program  Inpatient Daily Progress Note  ______________________________________________________________________________    Patient: Candis Chong  YOB: 1985   BZD:[id number]    Acct: [de-identified]     Room: 7917/8749-83  Admit date: 6/18/2022  Today's date: 06/21/22  Number of days in the hospital: 3    SUBJECTIVE   CC: Hypertension    Pt examined at bedside. Chart & results reviewed. - VSS, pt is saturating well on room air. Patient is afebrile, hypertensive with /86 and heart rate 85    - labs reviewed -sodium 130, potassium 3.6, chloride 98, creatinine 1.17, glucose 83  WBC 10.6, hemoglobin 11.5, platelet count 749  - no acute events overnight.   - Patient denies headache,nausea, vomiting, chest pain, cough, abdominal pain.     ROS:  General ROS: Completed and except as mentioned above were negative   HEENT ROS: Completed and except as mentioned above were negative   Allergy and Immunology ROS:  Completed and except as mentioned above were negative  Hematological and Lymphatic ROS:  Completed and except as mentioned above were negative  Respiratory ROS:  Completed and except as mentioned above were negative  Cardiovascular ROS:  Completed and except as mentioned above were negative  Gastrointestinal ROS: Completed and except as mentioned above were negative  Genito-Urinary ROS:  Completed and except as mentioned above were negative  Musculoskeletal ROS:  Completed and except as mentioned above were negative  Neurological ROS:  Completed and except as mentioned above were negative  Skin & Dermatological ROS:  Completed and except as mentioned above were negative  Psychological ROS:  Completed and except as mentioned above were negative  BRIEF HISTORY     The patient is X 63 y.o. female presents with a chief complaint of abdominal pain chest pain and was found to have hypertensive urgency.  Patient was 27 weeks pregnant at the time of presentation to ED.  Patient has underwent emergency C section  and was transferred to MICU for further management of hypertensive urgency that was resistant to several medications.     Internal medicine has been consulted for the management of 2.9 cm thyroid nodule, mildly enlarged bilateral axillary lymph nodes, probably reactive, solid right pulmonary nodule measuring 5 mm.        PMH:  Chronic hypertension,  History of preeclampsia  Pulmonary hypertension  Morbid obesity  Tobacco use  Marijuana use     On evaluation, patient has been afebrile, mildly hypertensive with /86, heart rate 68, and saturating well on room air.     Relevant labs include sodium 131, potassium 3.7, creatinine 1.28, glucose 102,     TSH 0.22, free thyroxine 1.05     WBC 14, hemoglobin 11, platelet count 983        Thyroid ultrasound:     Enlarged heterogeneous multinodular gland.       There are bilateral TR4 and TR3 nodules which meet TI-RADS criteria for an   attempt at ultrasound-guided FNA biopsy.  Based on ACR/TI-RADS   recommendations, ultrasound-guided FNA biopsy of the 2 largest nodules in the   left lobe is suggested with 1 year follow-up for additional nodules. OBJECTIVE     Vital Signs:  BP (!) 152/86   Pulse 85   Temp 98.4 °F (36.9 °C) (Oral)   Resp 18   SpO2 97%   Breastfeeding Unknown     Temp (24hrs), Av.1 °F (36.7 °C), Min:97.7 °F (36.5 °C), Max:98.6 °F (37 °C)    In: -   Out: 550 [Urine:550]    Physical Exam:  Constitutional: This is a well developed, well nourished, Greater than 40 - Morbid Obesity / Extreme Obesity / Grade III 42y.o. year old female who is alert, oriented, cooperative and in no apparent distress. Head:normocephalic and atraumatic. Ebonie English Respiratory:   Breath sounds bilaterally were clear to auscultation. There were no wheezes, rhonchi or rales. There is no intercostal retraction or use of accessory muscles.   Cardiovascular: Regular without murmur, clicks, gallops or rubs. Abdomen: Slightly rounded and soft. No rebound, rigidity or guarding was appreciated. Extremities:  No lower extremity edema, ulcerations, tenderness, varicosities or erythema. Muscle size, tone and strength are normal.  No involuntary movements are noted. Skin:  Warm and dry. Good color, turgor and pigmentation. No lesions or scars.   No cyanosis or clubbing  Neurological/Psychiatric: The patient's general behavior, level of consciousness, thought content and emotional status is normal.        Medications:  Scheduled Medications:    medroxyPROGESTERone  150 mg IntraMUSCular Q3 Months    gabapentin  300 mg Oral TID    sodium chloride flush  5-40 mL IntraVENous 2 times per day    acetaminophen  1,000 mg Oral Q8H    docusate sodium  100 mg Oral BID    prenatal vitamin  1 tablet Oral Daily    cephALEXin  500 mg Oral 3 times per day    And    metroNIDAZOLE  500 mg Oral 3 times per day    sodium chloride  500 mL IntraVENous Once    heparin (porcine)  7,500 Units SubCUTAneous Q12H    clindamycin   Topical BID    labetalol  200 mg Oral BID    NIFEdipine  120 mg Oral Daily    hydroCHLOROthiazide  25 mg Oral Daily     Continuous Infusions:    sodium chloride       PRN Medicationscalcium carbonate, 1,000 mg, TID PRN  sodium chloride flush, 5-40 mL, PRN  sodium chloride, , PRN  oxyCODONE, 5 mg, Q4H PRN   Or  oxyCODONE, 10 mg, Q4H PRN  naloxone, 0.4 mg, PRN  ondansetron, 4 mg, Q6H PRN  diphenhydrAMINE, 25 mg, Q6H PRN  simethicone, 80 mg, Q6H PRN  polyethylene glycol, 17 g, Daily PRN  magnesium hydroxide, 30 mL, Daily PRN  bisacodyl, 10 mg, Daily PRN  lanolin, , Q1H PRN  oxytocin, 10 Units, PRN        Diagnostic Labs:  CBC:   Recent Labs     06/19/22  1813 06/20/22  0648 06/21/22  0542   WBC 19.7* 14.0* 10.6   RBC 3.69* 3.39* 3.59*   HGB 11.7* 11.0* 11.5*   HCT 36.1* 34.1* 33.8*   MCV 97.8 100.6 94.2   RDW 14.9* 15.1* 15.0*    261 262     BMP:   Recent Labs 06/19/22  1813 06/20/22  0837 06/21/22  0542   * 131* 130*   K 3.9 3.7 3.6*    100 98   CO2 18* 20 19*   BUN 14 15 18   CREATININE 1.23* 1.28* 1.17*     BNP: No results for input(s): BNP in the last 72 hours. PT/INR: No results for input(s): PROTIME, INR in the last 72 hours. APTT: No results for input(s): APTT in the last 72 hours. CARDIAC ENZYMES: No results for input(s): CKMB, CKMBINDEX, TROPONINI in the last 72 hours. Invalid input(s): CKTOTAL;3  FASTING LIPID PANEL:  Lab Results   Component Value Date    CHOL 176 03/25/2017    HDL 54 03/25/2017    TRIG 106 03/25/2017     LIVER PROFILE:   Recent Labs     06/19/22 1813 06/20/22  0837 06/21/22  0542   AST 22 13 12   ALT 32 25 18   BILITOT 0.42 0.28* 0.33   ALKPHOS 104 96 94      MICROBIOLOGY:   Lab Results   Component Value Date/Time    CULTURE CULTURE IN PROGRESS 06/18/2022 02:27 PM       Imaging:    CT HEAD WO CONTRAST    Result Date: 6/18/2022  No acute intracranial abnormality visualized. Normal appearing [id number]. RECOMMENDATIONS: Unavailable     US HEAD NECK SOFT TISSUE THYROID    Result Date: 6/20/2022  Enlarged heterogeneous multinodular gland. There are bilateral TR4 and TR3 nodules which meet TI-RADS criteria for an attempt at ultrasound-guided FNA biopsy. Based on ACR/TI-RADS recommendations, ultrasound-guided FNA biopsy of the 2 largest nodules in the left lobe is suggested with 1 year follow-up for additional nodules. RECOMMENDATIONS: NODULE 1:  ACR TI-RADS TR4: Recommend:  Ultrasound-guided fine needle aspiration. NODULE 2:  ACR TI-RADS TR4: Recommend:  Ultrasound-guided fine needle aspiration. NODULE 3:  ACR TI-RADS TR3: Recommend:  Ultrasound-guided fine needle aspiration. NODULE 4:  ACR TI-RADS TR4: Recommend:  Ultrasound-guided fine needle aspiration.  ACR TI-RADS recommendations: TR5 (>= 7 points):  FNA if >= 1 cm; follow-up if 0.5-0.9 cm in 1, 2, 3, 4, and 5 years TR4 (4-6 points):  FNA if >= 1.5 cm; follow-up if 1.0-1.4 cm in 1, 2, 3, and 5 years TR3 (3 points):  FNA if >= 2.5 cm; follow-up if 1.5-2.4 cm in 1, 3, and 5 years TR2 (2 points):  No FNA or follow-up TR1 (0 points):  No FNA or follow-up ACR TI-RADS recommends that no more than two nodules with the highest ACR TI-RADS point total should be biopsied and no more than four nodules should be followed. CT CHEST PULMONARY EMBOLISM W CONTRAST    Result Date: 2022  1. No evidence of pulmonary embolism. 2. Mild pulmonary edema with trace bilateral pleural effusions. 3. Possible 2.9 cm thyroid nodule. Recommendation below. 4. Mildly enlarged bilateral axillary lymph nodes, probably reactive. 5. Solid pulmonary nodule of the right lung apex measuring 5 mm. Recommendation below. 6. Mild dilatation of the main pulmonary artery which can be seen in pulmonary arterial hypertension. RECOMMENDATIONS: 5 mm right solid pulmonary nodule within the upper lobe. If patient is low risk for malignancy, no routine follow-up imaging is recommended; if patient is high risk for malignancy, a non-contrast Chest CT at 12 months is optional. If performed and the nodule is stable at 12 months, no further follow-up is recommended. These guidelines do not apply to immunocompromised patients and patients with cancer. Follow up in patients with significant comorbidities as clinically warranted. For lung cancer screening, adhere to Lung-RADS guidelines. Reference: Radiology. 2017; 284(1):228-43. 2.9 cm incidental thyroid nodule. Recommend thyroid US. Reference: J Am Margoth Radiol. 2015 Feb;12(2): 143-50     CTA HEAD W CONTRAST    Result Date: 2022  No acute intracranial abnormality visualized. Normal appearing [id number].  RECOMMENDATIONS: Unavailable       ASSESSMENT & PLAN     Principal Problem:    RLTCS 22 F APG  Wt 1#12  Active Problems:    History of  x2    Chronic hypertension (meds)    History of pre-eclampsia x2    History Fetal growth restriction     THC Use     Tobacco use    Anemia affecting pregnancy in second trimester    Pulmonary edema (G3)    Solitary pulmonary nodule on lung CT    High-risk pregnancy in third trimester    Postpartum state    Postoperative state    Multinodular goiter    History of indicated  delivery    + HPV     AMA      Late prenatal care    cHTN (meds) w/ HARJINDER w/ SF (G3)    Thyroid nodule    Pulmonary hypertension (G3)  Resolved Problems:    * No resolved hospital problems. *    Thyroid nodule: Thyroid ultrasound showed enlarged heterogeneous multinodular gland. Since patient is breast-feeding radio active and an uptake scan will be delayed. Recommend fine-needle aspiration biopsy as  outpatient       Lung nodule:  Patient to follow-up as outpatient for further management.     History of tobacco smoking:  Advised cessation          Plan:     -Rest of the management as per primary team     Thank you for allowing us to participate in the management of Ms. Theodora Lopez. We will continue to follow. Please contact us if there are any questions.        Angella Rodriguez MD  PGY-1, Internal Medicine Resident  [facility]         2022, 6:36 AM

## 2022-06-21 NOTE — PROGRESS NOTES
POST OPERATIVE DAY # 3    Thomas Sarmiento is a 42 y.o. female   This patient was seen and examined today. RLTCS on 22    Her pregnancy was complicated by:   Patient Active Problem List   Diagnosis    History of  x2    Chronic hypertension (meds)    History of indicated  delivery    History of pre-eclampsia x2    + HPV     AMA      Late prenatal care    History Fetal growth restriction     THC Use     Tobacco use    Anemia affecting pregnancy in second trimester    RLTCS 22 F APG  Wt 1#12    cHTN (meds) w/ HARJINDER w/ SF (G3)    Pulmonary edema (G3)    Thyroid nodule    Pulmonary hypertension (G3)    Solitary pulmonary nodule on lung CT    High-risk pregnancy in third trimester    Postpartum state    Postoperative state    Multinodular goiter       Today she is doing well without any chief complaint. Her lochia is light. She denies chest pain, shortness of breath, headache, lightheadedness, blurred vision and peripheral edema. She is breast feeding and she denies any signs or symptoms of mastitis. She is ambulating well. She is voiding without difficulty. She currently denies S/S of postpartum depression. Flatus absent. Bowel movement absent. She is tolerating solids.     Vital Signs:  Vitals:    22 1520 22 1935 22 0009 22 0350   BP: 135/75 131/86 (!) 141/91 137/83   Pulse: 74 88 83    Resp:  18    Temp: 97.7 °F (36.5 °C) 97.8 °F (36.6 °C) 98.4 °F (36.9 °C)    TempSrc:  Oral Oral    SpO2: 97% 97%          Physical Exam:  General:  no apparent distress, alert and cooperative  Neurologic:  alert, oriented, normal speech, no focal findings or movement disorder noted  Lungs:  No increased work of breathing, good air exchange, clear to auscultation bilaterally, no crackles or wheezing  Heart:  Regular rate and rhythm, normal S1 and S2, no S3 or S4, and no murmur noted    Abdomen: abdomen soft, non-distended, non-tender, bowel sounds present  Fundus: non-tender, firm, below umbilicus  Incision: Prevena in place, good seal noted. Some bloody and white/yellow exudate noted along lower edge of purple sponge, suspect due to HS lesions  Extremities:  no calf tenderness, non edematous    Labs:  Lab Results   Component Value Date    WBC 14.0 (H) 06/20/2022    HGB 11.0 (L) 06/20/2022    HCT 34.1 (L) 06/20/2022    .6 06/20/2022     06/20/2022       Assessment/Plan:  1. Luna Truong is a [id number] POD # 3 s/p RLTCS        - Doing well, vitals improving postpartum, occasional elevated BP              - Female infant in NICU              - Encourage ambulation and use of incentive spirometer              - S/p de la cruz catheter and saline lock IV              - Trending CBC and CMP, next this AM              - Tylenol/Therese/Gabapentin for pain              - Heparin 7500 BID for DVT prophylaxis              - Keflex/Flagyl x48h postpartum  2. Rh positive/Rubella immune  3. Breast pumping              - Denies s/s mastitis  4. cHTN w/ HARJINDER w/ SF              - S/p Mag x24h postpartum              - Patient was prevoiusly on labetalol 200mg BID, continued during admission              - Receiving Procardia 120XL daily and HCTZ 25mg daily   - Continue to titrate BP medication as needed              - Patient currently denies s/s preE              - PreE labs trending, P/C of 39.54 on 6/18, increased from 0.10 on 4/7/22              - Pt last required IV medication 6/19/22              - CBC and CMP ordered for this AM                          - ALT/AST 46/29>43/35>38/23>36/23>32/22>25/13                          - Cr 1.15>1.00>1.29>1.25>1.23>1.28              - CTA Head negative for intracranial abnormality on 6/18/22  5.  Pulmonary Hypertension/Pulmonary Edema              - Noted on admission              - Cardiology consultation completed, recommended outpatient follow up              - BNP [id number]               - Trop 20>18>25>22>21               - Echo completed 6/18/22 with EF of 58%, mild pulmonary HTN noted              - CTPE on 6/18 reveals mild pulmonary edema with trace bilateral pleural effusions and mild dilation of the main pulmonary artery              - EKG 6/18/22 reveals sinus bradycardia which has improved  6. Thyroid Nodule              - Incidental finding on CTPE              - Thyroid studies wnl on admission              - Thyroid ultrasound completed with recommendation for FNA   - Internal Medicine consulted, recommend FNA, ordered as outpatient upon discharge  7. Lung Nodule              - Incidental finding on CTPE              - Internal medicine consultation, recommends outpatient follow up  8. Hydradenitis Suppurativa              - Pt with extensive lesions              - Continue topical Clinda, Rx provided on d/c  9. Hx Abnormal Pap              - Pap normal, HPV+ 4/2022              - Follow up in 1 year  10. Tobacco Use              - Encourage cessation  11. THC Use              - UDS positive               - SW consult ordered              - Encourage cessation  12. BMI 42  13. Continue post-op care. Counseling Completed:  Secondary Smoke risks and Sudden Infant Death Syndrome were reviewed with recommendations. Infant sleeping, \"back to sleep\" and avoidance of co-sleeping recommendations were reviewed. Signs and Symptoms of Post Partum Depression were reviewed. The patient is to call if any occur. Signs and symptoms of Mastitis were reviewed. The patient is to call if any occur for follow up.   Discharge instructions including pelvic rest, incision care, 15 lb weight restriction, no driving with pain medicine and office follow-up were reviewed with patient     Attending Physician: Dr. Tawanna Kirk,   Ob/Gyn Resident  6/21/2022, 5:45 AM         Attending Physician Statement  I have discussed the care of [facility], including pertinent history and exam findings,  with the resident. I have reviewed the key elements of all parts of the encounter with the resident. I agree with the assessment, plan and orders as documented by the resident. Pt overall feeling well today. Ambulating, tolerating PO, passing flatus. BPs relatively well controlled on current regimen of Labetalol, Procardia and HCTZ. Baby girl in NICU doing well. Pt reports SOB has mostly resolved, but she does wake up short of breath at night while laying flat. Will follow up with cardiology as an outpatient. IR consulted to see if thyroid biopsy can be done while she is admitted. Understands need for smoking cessation but is not ready to consider at this time. Knows she will need outpatient pulm consult for pulmonary nodule. Would like Depo while in the hospital but does desire risk reduction in ovarian cancer with RRBS eventually. Declines IUD at this time, may consider Nexplanon. Anticipate discharge home tomorrow.   (GE Modifier)    Electronically signed by Marybeth Dudley MD at 10:02 AM 06/21/22

## 2022-06-21 NOTE — TELEPHONE ENCOUNTER
APPOINTMENT MADE. Patient requested appointment and address be sent to her in the mail. This has been done.

## 2022-06-21 NOTE — LACTATION NOTE
Pt states she hasn't pumped all night, encouraged pumping as soon as she feels able. Reviewed frequency of pumping and importance of frequent stimulation, every 2-3 hours. Pt c/o pump \"cutting off. \" Reviewed initiation phase with pt, encouraged to use to mimic  feeding at breast and reassured frequent pump sessions will eventually increase milk volume. Encouraged to call out as needed.

## 2022-06-21 NOTE — PLAN OF CARE
Problem: Pain  Goal: Verbalizes/displays adequate comfort level or baseline comfort level  Outcome: Progressing     Problem: Safety - Adult  Goal: Free from fall injury  Outcome: Progressing     Problem: Chronic Conditions and Co-morbidities  Goal: Patient's chronic conditions and co-morbidity symptoms are monitored and maintained or improved  Outcome: Progressing     Problem: Discharge Planning  Goal: Discharge to home or other facility with appropriate resources  Outcome: Progressing     Problem: Vaginal Birth or  Section  Goal: Fetal and maternal status remain reassuring during the birth process  Description:  Birth OB-Pregnancy care plan goal which identifies if the fetal and maternal status remain reassuring during the birth process  Outcome: Progressing     Problem: Postpartum  Goal: Experiences normal postpartum course  Description:  Postpartum OB-Pregnancy care plan goal which identifies if the mother is experiencing a normal postpartum course  Outcome: Progressing  Goal: Appropriate maternal -  bonding  Description:  Postpartum OB-Pregnancy care plan goal which identifies if the mother and  are bonding appropriately  Outcome: Progressing  Goal: Establishment of infant feeding pattern  Description:  Postpartum OB-Pregnancy care plan goal which identifies if the mother is establishing a feeding pattern with their   Outcome: Progressing  Goal: Incisions, wounds, or drain sites healing without S/S of infection  Outcome: Progressing

## 2022-06-22 VITALS
SYSTOLIC BLOOD PRESSURE: 145 MMHG | DIASTOLIC BLOOD PRESSURE: 87 MMHG | HEART RATE: 71 BPM | TEMPERATURE: 98.4 F | OXYGEN SATURATION: 100 % | RESPIRATION RATE: 18 BRPM

## 2022-06-22 PROCEDURE — 6360000002 HC RX W HCPCS: Performed by: STUDENT IN AN ORGANIZED HEALTH CARE EDUCATION/TRAINING PROGRAM

## 2022-06-22 PROCEDURE — 99238 HOSP IP/OBS DSCHRG MGMT 30/<: CPT | Performed by: OBSTETRICS & GYNECOLOGY

## 2022-06-22 PROCEDURE — 6370000000 HC RX 637 (ALT 250 FOR IP): Performed by: STUDENT IN AN ORGANIZED HEALTH CARE EDUCATION/TRAINING PROGRAM

## 2022-06-22 PROCEDURE — 6370000000 HC RX 637 (ALT 250 FOR IP)

## 2022-06-22 RX ORDER — POTASSIUM CHLORIDE 20 MEQ/1
40 TABLET, EXTENDED RELEASE ORAL ONCE
Status: DISCONTINUED | OUTPATIENT
Start: 2022-06-22 | End: 2022-06-22 | Stop reason: HOSPADM

## 2022-06-22 RX ADMIN — DOCUSATE SODIUM 100 MG: 100 CAPSULE ORAL at 08:40

## 2022-06-22 RX ADMIN — LABETALOL HYDROCHLORIDE 200 MG: 200 TABLET, FILM COATED ORAL at 06:05

## 2022-06-22 RX ADMIN — HEPARIN SODIUM 7500 UNITS: 5000 INJECTION INTRAVENOUS; SUBCUTANEOUS at 00:01

## 2022-06-22 RX ADMIN — NIFEDIPINE 120 MG: 30 TABLET, FILM COATED, EXTENDED RELEASE ORAL at 08:41

## 2022-06-22 RX ADMIN — MEDROXYPROGESTERONE ACETATE 150 MG: 150 INJECTION, SUSPENSION, EXTENDED RELEASE INTRAMUSCULAR at 12:23

## 2022-06-22 RX ADMIN — ACETAMINOPHEN 1000 MG: 500 TABLET ORAL at 06:06

## 2022-06-22 RX ADMIN — OXYCODONE 5 MG: 5 TABLET ORAL at 08:40

## 2022-06-22 RX ADMIN — CLINDAMYCIN PHOSPHATE: 10 LOTION TOPICAL at 08:40

## 2022-06-22 RX ADMIN — GABAPENTIN 300 MG: 300 CAPSULE ORAL at 08:40

## 2022-06-22 RX ADMIN — HYDROCHLOROTHIAZIDE 25 MG: 25 TABLET ORAL at 08:40

## 2022-06-22 RX ADMIN — Medication 1 TABLET: at 08:40

## 2022-06-22 RX ADMIN — OXYCODONE 10 MG: 5 TABLET ORAL at 03:50

## 2022-06-22 RX ADMIN — CLINDAMYCIN PHOSPHATE: 10 LOTION TOPICAL at 00:02

## 2022-06-22 ASSESSMENT — PAIN SCALES - GENERAL
PAINLEVEL_OUTOF10: 7
PAINLEVEL_OUTOF10: 8

## 2022-06-22 NOTE — PROGRESS NOTES
OB/GYN Resident Interval Note    Prevena changed due to increased serosanguinous fluid leaking. Incision is healing really well. No areas of infection noted. Small HS lesion near incision has decreased in size and no exudate was able to be expressed. Two Prevena's were attempted to be placed but both malfunctioned. Silver dressing applied with no issues. Patient tolerated dressing change well. Patient stable for discharge.        Gwen Sifuentes DO   OB/GYN Resident [id number] [facility] [place]  6/22/2022, 10:43 AM

## 2022-06-22 NOTE — PROGRESS NOTES
POST OPERATIVE DAY # 4    Inge Marquez is a 42 y.o. female   This patient was seen and examined today. RLTCS on 22    Her pregnancy was complicated by:   Patient Active Problem List   Diagnosis    History of  x2    Chronic hypertension (meds)    History of indicated  delivery    History of pre-eclampsia x2    + HPV     AMA      Late prenatal care    History Fetal growth restriction     THC Use     Tobacco use    Anemia affecting pregnancy in second trimester    RLTCS 22 F APG  Wt 1#12    cHTN (meds) w/ HARJINDER w/ SF (G3)    Pulmonary edema (G3)    Thyroid nodule    Pulmonary hypertension (G3)    Solitary pulmonary nodule on lung CT    High-risk pregnancy in third trimester    Postpartum state    Postoperative state    Multinodular goiter       Today she is doing well without any chief complaint. Her lochia is light. She denies chest pain, shortness of breath, headache, lightheadedness, blurred vision and peripheral edema. She is breast feeding and she denies any signs or symptoms of mastitis. She is ambulating well. She is voiding without difficulty. She currently denies S/S of postpartum depression. Flatus present. Bowel movement absent. She is tolerating solids.     Vital Signs:  Vitals:    22 2030 22 0000 22 0350 22 0830   BP: 126/87 139/87 139/84 (!) 145/87   Pulse: 83 87 81 71   Resp:  18   Temp: 97.9 °F (36.6 °C) 98.5 °F (36.9 °C) 97.5 °F (36.4 °C) 98.4 °F (36.9 °C)   TempSrc: Oral Oral Oral Oral   SpO2: 100%           Physical Exam:  General:  no apparent distress, alert and cooperative  Neurologic:  alert, oriented, normal speech, no focal findings or movement disorder noted  Lungs:  No increased work of breathing, good air exchange, clear to auscultation bilaterally, no crackles or wheezing  Heart:  Regular rate and rhythm, normal S1 and S2, no S3 or S4, and no murmur noted    Abdomen: abdomen soft, non-distended, non-tender, bowel sounds present  Fundus: non-tender, firm, below umbilicus  Incision: Prevena in place, good seal noted. Some bloody and white/yellow exudate noted along lower edge of purple sponge, suspect due to HS lesions  Extremities:  no calf tenderness, non edematous    Labs:  Lab Results   Component Value Date    WBC 10.6 06/21/2022    HGB 11.5 (L) 06/21/2022    HCT 33.8 (L) 06/21/2022    MCV 94.2 06/21/2022     06/21/2022       Assessment/Plan:  1. [place] is a [id number] POD # 4 s/p RLTCS        - Doing well, vitals improving postpartum, occasional elevated BP              - Female infant in NICU              - Encourage ambulation and use of incentive spirometer              - S/p de la cruz catheter and saline lock IV              - Trending CBC and CMP, continuing to improve              - Tylenol/Therese/Gabapentin for pain              - Heparin 7500 BID for DVT prophylaxis              - Keflex/Flagyl x48h postpartum  2. Rh positive/Rubella immune  3. Breast pumping              - Denies s/s mastitis  4. cHTN w/ HARJINDER w/ SF              - S/p Mag x24h postpartum              - Patient was prevoiusly on labetalol 200mg BID, continued during admission              - Receiving Procardia 120XL daily and HCTZ 25mg daily              - Continue to titrate BP medication as needed              - Patient currently denies s/s preE              - PreE labs trending, P/C of 39.54 on 6/18, increased from 0.10 on 4/7/22              - Pt last required IV medication 6/19/22              - CBC and CMP improving                          - ALT/AST 46/29>43/35>38/23>36/23>32/22>25/13>18/12                          - Cr 1.15>1.00>1.29>1.25>1.23>1.28>1. 16              - CTA Head negative for intracranial abnormality on 6/18/22  5.  Pulmonary Hypertension/Pulmonary Edema              - Noted on admission              - Cardiology consultation completed, recommended outpatient follow up              - BNP 1578>1904>1649>1356               - Trop 20>18>25>22>21               - Echo completed 6/18/22 with EF of 58%, mild pulmonary HTN noted              - CTPE on 6/18 reveals mild pulmonary edema with trace bilateral pleural effusions and mild dilation of the main pulmonary artery              - EKG 6/18/22 reveals sinus bradycardia which has improved  6. Thyroid Nodule              - Incidental finding on CTPE              - Thyroid studies wnl on admission              - Thyroid ultrasound completed with recommendation for FNA              - Internal Medicine consulted, recommend FNA, ordered as outpatient upon discharge  7. Lung Nodule              - Incidental finding on CTPE              - Internal medicine consultation, recommends outpatient follow up  8. Hydradenitis Suppurativa              - Pt with extensive lesions              - Continue topical Clinda, Rx provided on d/c  9. Hx Abnormal Pap              - Pap normal, HPV+ 4/2022              - Follow up in 1 year  10. Tobacco Use              - Encourage cessation  11. THC Use              - UDS positive               - SW consult ordered              - Encourage cessation  12. BMI 42  13. Continue post-op care. Plan for discharge today if patient remains stable. Counseling Completed:  Secondary Smoke risks and Sudden Infant Death Syndrome were reviewed with recommendations. Infant sleeping, \"back to sleep\" and avoidance of co-sleeping recommendations were reviewed. Signs and Symptoms of Post Partum Depression were reviewed. The patient is to call if any occur. Signs and symptoms of Mastitis were reviewed. The patient is to call if any occur for follow up.   Discharge instructions including pelvic rest, incision care, 15 lb weight restriction, no driving with pain medicine and office follow-up were reviewed with patient     Attending Physician: Dr. Ann De Jesus DO  Ob/Gyn Resident  6/22/2022, 6:31 AM     Date: 6/22/2022  Time: 11:18 AM      Patient Name: Thomas Sarmiento  Patient : 1985  Room/Bed: 6574/5042-69  Admission Date/Time: 2022 12:50 PM        Attending Physician Statement  I have discussed the care of Thomas Sarmiento, including pertinent history and exam findings with the resident. I have reviewed and edited their note in the electronic medical record. The key elements of all parts of the encounter have been performed/reviewed by me . I agree with the assessment, plan and orders as documented by the resident. The level of care submitted represents to the best of my ability the care documented in the medical record today. GC Modifier. This service has been performed in part by a resident under the direction of a teaching physician. POD#4 with significant clinical improvement. Discharge home on current meds with close follow up at [facility]. Precautions given for preeclampsia and DVT.      Attending's Name:  Pasquale Redding DO

## 2022-06-22 NOTE — PROGRESS NOTES
Depo given per order. Discharge order received. Discharge instructions given. Verbalized understanding. Labs linked to annual appt that was scheduled.   none

## 2022-06-22 NOTE — PLAN OF CARE
Pt seen and chart reviewed this morning. BP well controlled, she is on HTCZ 25mg, Labetalol 200 mg BID, Procardia  mg. Labs from yesterday show low Potassium, discussed with RN, Potassium 40 meq once ordered. Also notified RN that pt has allergy to K, unspecified. Pt need to follow up with PCP for :  1. HTN and med reconciliation  2. CT findings of Lung nodule. 3. CT and USG finding of Thyroid nodule. Pt needs to make appointment with Endocrinology. CT and USG routed to PCP by attending. OK for discharge from IM stand point, will sign off. Please reach out to us if any question / concerns.         Radha Miramontes MD  Internal Medicine Resident, PGY-2  [facility]         6/22/2022, 8:33 AM

## 2022-06-22 NOTE — PROGRESS NOTES
CLINICAL PHARMACY NOTE: MEDS TO BEDS    Total # of Prescriptions Filled: 4   The following medications were delivered to the patient:  · Docusate 100mg capsules  · Oxycodone 5mg tablets  · Tylenol 500mg tablets  · Clindamycin phophate 1% gel    Additional Documentation: medications delivered to the pt in room 749 on 06.22.22 at 09:37am, no co pay. There was 1 visitor in the room at the time of delivery.

## 2022-07-18 ENCOUNTER — TELEPHONE (OUTPATIENT)
Dept: INTERVENTIONAL RADIOLOGY/VASCULAR | Age: 37
End: 2022-07-18

## 2022-07-18 NOTE — TELEPHONE ENCOUNTER
Spoke to pt in regard to missing appt on 7/15/22. Pt stated that her cab did not show and was unable to find our number. Pt stated that she will call back to schedule once she is able to confirm transportation. Call back number given.

## 2022-10-12 ENCOUNTER — OFFICE VISIT (OUTPATIENT)
Dept: PULMONOLOGY | Age: 37
End: 2022-10-12
Payer: MEDICARE

## 2022-10-12 VITALS
BODY MASS INDEX: 46.93 KG/M2 | SYSTOLIC BLOOD PRESSURE: 160 MMHG | WEIGHT: 292 LBS | HEART RATE: 71 BPM | RESPIRATION RATE: 16 BRPM | DIASTOLIC BLOOD PRESSURE: 96 MMHG | HEIGHT: 66 IN | OXYGEN SATURATION: 98 %

## 2022-10-12 DIAGNOSIS — F17.290 VAPING NICOTINE DEPENDENCE, TOBACCO PRODUCT: ICD-10-CM

## 2022-10-12 DIAGNOSIS — I10 ESSENTIAL HYPERTENSION: ICD-10-CM

## 2022-10-12 DIAGNOSIS — F17.200 SMOKING: ICD-10-CM

## 2022-10-12 DIAGNOSIS — I27.20 PULMONARY HYPERTENSION (HCC): ICD-10-CM

## 2022-10-12 DIAGNOSIS — E04.1 THYROID NODULE: ICD-10-CM

## 2022-10-12 DIAGNOSIS — R91.1 SOLITARY PULMONARY NODULE ON LUNG CT: Primary | ICD-10-CM

## 2022-10-12 PROCEDURE — G8427 DOCREV CUR MEDS BY ELIG CLIN: HCPCS | Performed by: INTERNAL MEDICINE

## 2022-10-12 PROCEDURE — G8417 CALC BMI ABV UP PARAM F/U: HCPCS | Performed by: INTERNAL MEDICINE

## 2022-10-12 PROCEDURE — 4004F PT TOBACCO SCREEN RCVD TLK: CPT | Performed by: INTERNAL MEDICINE

## 2022-10-12 PROCEDURE — G8484 FLU IMMUNIZE NO ADMIN: HCPCS | Performed by: INTERNAL MEDICINE

## 2022-10-12 PROCEDURE — 99214 OFFICE O/P EST MOD 30 MIN: CPT | Performed by: INTERNAL MEDICINE

## 2022-10-12 NOTE — PROGRESS NOTES
PULMONARY OP  PROGRESS NOTE      Patient:  Sathya Guzmanr  YOB: 1985    MRN: [id number]     Acct:        Pt seen and Chart reviewed. Sathya Liner is here in followup for   1. Solitary pulmonary nodule on lung CT    2. Pulmonary hypertension (G3)    3. Thyroid nodule    4. Smoking    5. Vaping nicotine dependence, tobacco product    6. Essential hypertension          Pt  has not been hospitalizedor been to er since last visit. Patient is being seen as a follow-up from [facility]. Fuad's hospitalization  Patient diagnosed with solitary pulmonary nodule and also pulmonary hypertension during that visit  Also had a thyroid nodule which is being followed by her primary care provider  Has been using meds as recommended.   Denied any shortness of breath  No wheezing  No coughing  No palpitation, chest pain, orthopnea, PND or increasing pedal edema  No pain in the legs  No history of DVT or PE  Reports snoring with daytime sleepiness and fatigue and tiredness    Subjective:  Review of Systems    Review of Systems -   General ROS: Completed and except as mentioned above were negative   Psychological ROS:  Completed and except as mentioned above were negative  Ophthalmic ROS:  Completed and except as mentioned above were negative  ENT ROS:  Completed and except as mentioned above were negative  Allergy and Immunology ROS:  Completed and except as mentioned above werenegative  Hematological and Lymphatic ROS:  Completed and except as mentioned above were negative  Endocrine ROS: Completed and except as mentioned above were negative  Respiratory ROS:  Completed and except asmentioned above were negative  Cardiovascular ROS:  Completed and except as mentioned above were negative  Gastrointestinal ROS: Completed and except as mentioned above were negative  Genito-Urinary ROS:  Completedand except as mentioned above were negative  Musculoskeletal ROS:  Completed and except as mentioned above were negative  Neurological ROS:  Completed and except as mentioned above were negative  Dermatological ROS:Completed and except as mentioned above were negative    SLEEP  No epistaxis or sore throat. has fatigue, tiredness and sleepiness in am.    No MVA. No edema. Allergies: Allergies   Allergen Reactions    Asa [Aspirin]     Bee Pollen     Dust Mite Extract     Potassium-Containing Compounds     Shellfish-Derived Products     Strawberry Extract        Medications:    Current Outpatient Medications:     acetaminophen (TYLENOL) 500 MG tablet, Take 2 tablets by mouth every 6 hours as needed for Pain, Disp: 60 tablet, Rfl: 1    calcium carbonate (OS-BAR) 1250 (500 Ca) MG chewable tablet, Take 200 mg by mouth daily, Disp: , Rfl:     cetirizine (ZYRTEC) 10 MG tablet, Take 10 mg by mouth nightly, Disp: , Rfl:     diphenhydrAMINE (BENADRYL) 25 MG capsule, Take 25 mg by mouth every 6 hours as needed, Disp: , Rfl:     famotidine (PEPCID) 20 MG tablet, Take 20 mg by mouth 2 times daily, Disp: , Rfl:     pyridoxine (B-6) 50 MG tablet, Take 25 mg by mouth daily, Disp: , Rfl:     lidocaine (LIDODERM) 5 %, Place 1 patch onto the skin daily 12 hours on, 12 hours off., Disp: 10 patch, Rfl: 0      Objective:    Physical Exam:  Vitals: BP (!) 160/96 (Site: Left Lower Arm, Position: Sitting, Cuff Size: Medium Adult)   Pulse 71   Resp 16   Ht 5' 6\" (1.676 m)   Wt 292 lb (132.5 kg)   SpO2 98%   BMI 47.13 kg/m²   Last 3 weights: Wt Readings from Last 3 Encounters:   10/12/22 292 lb (132.5 kg)   04/18/22 265 lb (120.2 kg)   04/17/21 280 lb (127 kg)     Body mass index is 47.13 kg/m².   Physical Examination:   PHYSICAL EXAMINATION:    Vitals:    10/12/22 0857 10/12/22 0859   BP: (!) 160/98 (!) 160/96   Site: Right Lower Arm Left Lower Arm   Position: Sitting Sitting   Cuff Size: Medium Adult Medium Adult   Pulse: 71    Resp: 16    SpO2: 98%    Weight: 292 lb (132.5 kg)    Height: 5' 6\" (1.676 m)        Constitutional: This is a well developed, well nourished, Greater than 36 - Morbid Obesity / Extreme Obesity / Grade III 42y.o. year old female who is alert, oriented, cooperative and in no apparent distress. Head:normocephalic and atraumatic. EENT:  SUGEY. No conjunctival injections. Septum was midline, mucosa was without erythema, exudates or cobblestoning. No thrush was noted. MallampatiIII (soft palate, base of uvula visible). Patient has goiter  Neck: Supple without thyromegaly. No elevated JVP. Trachea was midline. Respiratory: Chest was symmetrical without dullness to percussion. Breath sounds bilaterally were clear to auscultation. There were no wheezes, rhonchi or rales. There is no intercostal retraction or use of accessory muscles. No egophony noted. Cardiovascular: Regular without murmur, clicks, gallops or rubs. Abdomen: Slightly rounded and soft without organomegaly. No rebound, rigidity or guarding was appreciated. Lymphatic: No lymphadenopathy. Musculoskeletal: Normal curvature of the spine. No gross muscle weakness. Extremities:  does not have lower extremity edema,  no ulcerations, tenderness, varicosities or erythema. Muscle size, tone and strength are normal.  No involuntary movements are noted. Skin:  Warm and dry. Good color, turgor and pigmentation. No lesions or scars. No cyanosis or clubbing  Neurological/Psychiatric: The patient's general behavior, level of consciousness, thought content and emotional status is normal.          DATA:     Pulmonary function tests: none      CT scan of the chest for pulmonary embolism was done on 6/18/2022 and it showed-    1. No evidence of pulmonary embolism. 2. Mild pulmonary edema with trace bilateral pleural effusions. 3. Possible 2.9 cm thyroid nodule. Recommendation below. 4. Mildly enlarged bilateral axillary lymph nodes, probably reactive. 5. Solid pulmonary nodule of the right lung apex measuring 5 mm. Recommendation below.    6. Mild dilatation of the main pulmonary artery which can be seen in   pulmonary arterial hypertension. RECOMMENDATIONS:   5 mm right solid pulmonary nodule within the upper lobe. If patient is low   risk for malignancy, no routine follow-up imaging is recommended; if patient   is high risk for malignancy, a non-contrast Chest CT at 12 months is   optional. If performed and the nodule is stable at 12 months, no further   follow-up is recommended. IMPRESSION:   1. Solitary pulmonary nodule on lung CT    2. Pulmonary hypertension (G3)    3. Thyroid nodule    4. Smoking    5. Vaping nicotine dependence, tobacco product    6. Essential hypertension                   PLAN:     Reviewed CT scan of the chest done recently in the hospital  Ordered LEOLA screen with reflex, rheumatoid factor screen, baseline sleep study with CPAP titration if needed, venous blood gas, pulmonary function test  She will need a CT scan of the chest to follow-up on the lung nodule in 6 to 9 months  Refills were provided -none  Omi Beckman received counseling on the following healthy behaviors: nutrition, exercise and medication adherence  Recommend flu vaccination in the fall annually. Recommendations given regarding pneumococcal vaccinations. Recommend COVID-19 vaccination  Patient is not uptodate with vaccinations from pulmonary perspective. Maintain an active lifestyle. recommend smoking cessation. Omi Beckman was instructed on smoking cessation for greater than 10 minutes. I discussed with this patient today the risks and benefits of various tobacco cessation strategies  All the questions that the patient has had were answered to   her satisfaction. Home O2 evaluation was done. Supplemental oxygen was not indicated  After reviewing the patient's smoking history and her age patient does not meet the criteria for lung cancer screening. We'll see the patient back in 3 months or earlier if needed.   Patient will call us if she is sick, so she can be seen sooner. Thank you for having us involved in the care of your patient. Please call us if you have any questions or concerns.           Perry Webb MD, MD             10/12/2022, 7:35 PM

## 2022-10-20 ENCOUNTER — TELEPHONE (OUTPATIENT)
Dept: INTERVENTIONAL RADIOLOGY/VASCULAR | Age: 37
End: 2022-10-20

## 2022-12-19 ENCOUNTER — HOSPITAL ENCOUNTER (EMERGENCY)
Age: 37
Discharge: HOME | End: 2022-12-19
Attending: STUDENT IN AN ORGANIZED HEALTH CARE EDUCATION/TRAINING PROGRAM
Payer: MEDICARE

## 2022-12-19 VITALS
HEART RATE: 76 BPM | DIASTOLIC BLOOD PRESSURE: 93 MMHG | OXYGEN SATURATION: 99 % | RESPIRATION RATE: 20 BRPM | TEMPERATURE: 97.8 F | SYSTOLIC BLOOD PRESSURE: 158 MMHG

## 2022-12-19 DIAGNOSIS — R51.9 NONINTRACTABLE HEADACHE, UNSPECIFIED CHRONICITY PATTERN, UNSPECIFIED HEADACHE TYPE: Primary | ICD-10-CM

## 2022-12-19 PROCEDURE — 96375 TX/PRO/DX INJ NEW DRUG ADDON: CPT

## 2022-12-19 PROCEDURE — 2580000003 HC RX 258: Performed by: STUDENT IN AN ORGANIZED HEALTH CARE EDUCATION/TRAINING PROGRAM

## 2022-12-19 PROCEDURE — 99284 EMERGENCY DEPT VISIT MOD MDM: CPT

## 2022-12-19 PROCEDURE — 6360000002 HC RX W HCPCS: Performed by: STUDENT IN AN ORGANIZED HEALTH CARE EDUCATION/TRAINING PROGRAM

## 2022-12-19 PROCEDURE — 96374 THER/PROPH/DIAG INJ IV PUSH: CPT

## 2022-12-19 RX ORDER — DIPHENHYDRAMINE HYDROCHLORIDE 50 MG/ML
25 INJECTION INTRAMUSCULAR; INTRAVENOUS ONCE
Status: COMPLETED | OUTPATIENT
Start: 2022-12-19 | End: 2022-12-19

## 2022-12-19 RX ORDER — METOCLOPRAMIDE HYDROCHLORIDE 5 MG/ML
10 INJECTION INTRAMUSCULAR; INTRAVENOUS ONCE
Status: COMPLETED | OUTPATIENT
Start: 2022-12-19 | End: 2022-12-19

## 2022-12-19 RX ORDER — 0.9 % SODIUM CHLORIDE 0.9 %
1000 INTRAVENOUS SOLUTION INTRAVENOUS ONCE
Status: COMPLETED | OUTPATIENT
Start: 2022-12-19 | End: 2022-12-19

## 2022-12-19 RX ORDER — KETOROLAC TROMETHAMINE 30 MG/ML
15 INJECTION, SOLUTION INTRAMUSCULAR; INTRAVENOUS ONCE
Status: COMPLETED | OUTPATIENT
Start: 2022-12-19 | End: 2022-12-19

## 2022-12-19 RX ADMIN — SODIUM CHLORIDE 1000 ML: 9 INJECTION, SOLUTION INTRAVENOUS at 20:56

## 2022-12-19 RX ADMIN — METOCLOPRAMIDE 10 MG: 5 INJECTION, SOLUTION INTRAMUSCULAR; INTRAVENOUS at 20:57

## 2022-12-19 RX ADMIN — KETOROLAC TROMETHAMINE 15 MG: 30 INJECTION, SOLUTION INTRAMUSCULAR; INTRAVENOUS at 20:56

## 2022-12-19 RX ADMIN — DIPHENHYDRAMINE HYDROCHLORIDE 25 MG: 50 INJECTION, SOLUTION INTRAMUSCULAR; INTRAVENOUS at 20:57

## 2022-12-19 ASSESSMENT — ENCOUNTER SYMPTOMS
SHORTNESS OF BREATH: 0
EYE DISCHARGE: 0
VOMITING: 0
NAUSEA: 0
RHINORRHEA: 0
EYE REDNESS: 0
DIARRHEA: 0
SORE THROAT: 0
ABDOMINAL PAIN: 0

## 2022-12-19 ASSESSMENT — PAIN DESCRIPTION - ORIENTATION: ORIENTATION: LEFT

## 2022-12-19 ASSESSMENT — PAIN SCALES - GENERAL: PAINLEVEL_OUTOF10: 10

## 2022-12-19 ASSESSMENT — PAIN DESCRIPTION - DESCRIPTORS: DESCRIPTORS: SHARP;THROBBING

## 2022-12-19 ASSESSMENT — PAIN DESCRIPTION - LOCATION: LOCATION: HEAD

## 2022-12-19 ASSESSMENT — PAIN - FUNCTIONAL ASSESSMENT: PAIN_FUNCTIONAL_ASSESSMENT: 0-10

## 2022-12-20 NOTE — ED PROVIDER NOTES
EMERGENCY DEPARTMENT ENCOUNTER    Pt Name: Frieda Smith  MRN: [id number]  [place] 1985  Date of evaluation: 22  CHIEF COMPLAINT       Chief Complaint   Patient presents with    Headache     X 2 days. Tylenol @1530. Pain 10/10, sharp/throbbing, photosensitive, no changes to vision. No migraine Hx     HISTORY OF PRESENT ILLNESS   42year-old presents with headache    Gradual onset. 3 days. Diffuse. Aching. Throbbing. She has no fevers chills numbness tingling weakness bowel or bladder incontinence. No trauma. No neck pain or stiffness    Has had some mild congestion and multiple sick contacts including her child and mother            REVIEW OF SYSTEMS     Review of Systems   Constitutional:  Negative for chills and fever. HENT:  Negative for rhinorrhea and sore throat. Eyes:  Negative for discharge and redness. Respiratory:  Negative for shortness of breath. Cardiovascular:  Negative for chest pain. Gastrointestinal:  Negative for abdominal pain, diarrhea, nausea and vomiting. Genitourinary:  Negative for dysuria, frequency and urgency. Musculoskeletal:  Negative for arthralgias and myalgias. Skin:  Negative for rash. Neurological:  Positive for headaches. Negative for weakness and numbness. Psychiatric/Behavioral:  Negative for suicidal ideas. All other systems reviewed and are negative.   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    AMA   O09.529    Late prenatal care O09.30    History Fetal growth restriction  Z87.898    THC Use  F12.10    Tobacco use Z72.0    Anemia affecting pregnancy in second trimester O99.012    RLTCS 22 F APG  Wt 1#12 O82    cHTN (meds) w/ HARJINDER w/ SF (G3) O14.10    Pulmonary edema (G3) J81.1    Thyroid nodule E04.1    Pulmonary hypertension (G3) I27.20    Solitary pulmonary nodule on lung CT R91.1    High-risk pregnancy in third trimester O09.93    Postpartum state Z39.2    Postoperative state Z98.890    Multinodular goiter E04.2     SURGICAL HISTORY       Past Surgical History:   Procedure Laterality Date     SECTION      x2     SECTION N/A 2022     SECTION performed by Pippa Arevalo DO at [facility] L[facility] OR     CURRENT MEDICATIONS       Previous Medications    ACETAMINOPHEN (TYLENOL) 500 MG TABLET    Take 2 tablets by mouth every 6 hours as needed for Pain    CALCIUM CARBONATE (OS-BAR) 1250 (500 CA) MG CHEWABLE TABLET    Take 200 mg by mouth daily    CETIRIZINE (ZYRTEC) 10 MG TABLET    Take 10 mg by mouth nightly    DIPHENHYDRAMINE (BENADRYL) 25 MG CAPSULE    Take 25 mg by mouth every 6 hours as needed    FAMOTIDINE (PEPCID) 20 MG TABLET    Take 20 mg by mouth 2 times daily    LIDOCAINE (LIDODERM) 5 %    Place 1 patch onto the skin daily 12 hours on, 12 hours off. PYRIDOXINE (B-6) 50 MG TABLET    Take 25 mg by mouth daily     ALLERGIES     is allergic to asa [aspirin], bee pollen, dust mite extract, potassium-containing compounds, shellfish-derived products, and strawberry extract. FAMILY HISTORY     has no family status information on file.       SOCIAL HISTORY       Social History     Tobacco Use    Smoking status: Every Day     Packs/day: 0.25     Years: 22.00     Pack years: 5.50     Types: Cigarettes    Smokeless tobacco: Never   Substance Use Topics    Alcohol use: Not Currently    Drug use: Yes     Types: Marijuana (Weed)     PHYSICAL EXAM     INITIAL VITALS: BP (!) 158/93   Pulse 76   Temp 97.8 °F (36.6 °C)   Resp 20   SpO2 99%    Physical Exam  Vitals and nursing note reviewed. Constitutional:       Appearance: Normal appearance. HENT:      Head: Normocephalic and atraumatic. Nose: Nose normal.      Mouth/Throat:      Mouth: Mucous membranes are moist.   Eyes:      Conjunctiva/sclera: Conjunctivae normal.      Pupils: Pupils are equal, round, and reactive to light. Cardiovascular:      Rate and Rhythm: Normal rate and regular rhythm. Pulses: Normal pulses. Heart sounds: Normal heart sounds. Pulmonary:      Effort: Pulmonary effort is normal.      Breath sounds: Normal breath sounds. Abdominal:      Palpations: Abdomen is soft. Tenderness: There is no abdominal tenderness. Musculoskeletal:         General: No swelling or deformity. Cervical back: Normal range of motion. Skin:     General: Skin is warm. Findings: No rash. Neurological:      General: No focal deficit present. Mental Status: She is alert and oriented to person, place, and time. Comments: Cranial nerves II through XII intact, 5 out of 5 strength in upper and lower extremities, sensation intact throughout, normal finger-nose   Psychiatric:         Mood and Affect: Mood normal.       MEDICAL DECISION MAKING / ED COURSE:   Summary of Patient Presentation:      58-year-old that is presenting with a headache for 3 days    She is well-appearing afebrile normal neurologic exam and no meningeal signs    We will establish an IV to provide medication to try to relieve the headache and swab for COVID and flu given sick contacts and some congestion    1)  Number and Complexity of Problems  Problem List This Visit: Headache    Differential Diagnosis: Tension headache, migraine headache, cluster headache, COVID, flu    Diagnoses Considered but Do Not Suspect: Intracranial bleed, intracranial mass, meningitis    Pertinent Comorbid Conditions: Hypertension    2)  Data Reviewed    See more data below for the lab and radiology tests and orders.     3)  Treatment and Disposition    Patient repeat assessment: Patient was sleeping upon reassessment upon waking states her headache is improved    Disposition discussion with patient/family: Patient was seen and eval for headache    She is feeling much better after treatment here    We discussed discharge home with close follow-up with her primary doctor within the next 2 days and neurology follow-up if persistent headaches    Discussed return precautions for severe worsening, numbness tingling weakness fevers she is agreeable with this plan      \"ED Course\" Notes From Epic Narrator:       Patient seen and evaluated for headache    Likely migraine    Low suspicion for bleed or meningitis    Discharged with symptomatic therapy    CRITICAL CARE:       PROCEDURES:    Procedures      DATA FOR LAB AND RADIOLOGY TESTS ORDERED BELOW ARE REVIEWED BY THE ED CLINICIAN:    RADIOLOGY: All x-rays, CT, MRI, and formal ultrasound images (except ED bedside ultrasound) are read by the radiologist, see reports below, unless otherwise noted in MDM or here. Reports below are reviewed by myself. No orders to display       LABS: Lab orders shown below, the results are reviewed by myself, and all abnormals are listed below. Labs Reviewed   COVID-19 & INFLUENZA COMBO       Vitals Reviewed:    Vitals:    12/19/22 1943   BP: (!) 158/93   Pulse: 76   Resp: 20   Temp: 97.8 °F (36.6 °C)   SpO2: 99%     MEDICATIONS GIVEN TO PATIENT THIS ENCOUNTER:  Orders Placed This Encounter   Medications    0.9 % sodium chloride bolus    ketorolac (TORADOL) injection 15 mg    metoclopramide (REGLAN) injection 10 mg    diphenhydrAMINE (BENADRYL) injection 25 mg     DISCHARGE PRESCRIPTIONS:  New Prescriptions    No medications on file     PHYSICIAN CONSULTS ORDERED THIS ENCOUNTER:  None  FINAL IMPRESSION      1.  Nonintractable headache, unspecified chronicity pattern, unspecified headache type          DISPOSITION/PLAN   DISPOSITION Decision To Discharge 12/19/2022 10:12:47 PM      OUTPATIENT FOLLOW UP THE PATIENT:  Madi Mackenzie, APRN - CNP  [address] [place] [facility]  [phone]    Schedule an appointment as soon as possible for a visit       [facility] ED  [address] [facility]  [phone]    As needed    MD Shannon Hudson MD  12/19/22 [id number]

## 2023-11-27 ENCOUNTER — HOSPITAL ENCOUNTER (EMERGENCY)
Age: 38
Discharge: HOME | End: 2023-11-27
Attending: EMERGENCY MEDICINE
Payer: MEDICAID

## 2023-11-27 ENCOUNTER — APPOINTMENT (OUTPATIENT)
Dept: GENERAL RADIOLOGY | Age: 38
End: 2023-11-27
Payer: MEDICAID

## 2023-11-27 VITALS
SYSTOLIC BLOOD PRESSURE: 172 MMHG | DIASTOLIC BLOOD PRESSURE: 107 MMHG | HEART RATE: 71 BPM | OXYGEN SATURATION: 98 % | TEMPERATURE: 97.5 F | RESPIRATION RATE: 20 BRPM | WEIGHT: 293 LBS | BODY MASS INDEX: 47.72 KG/M2

## 2023-11-27 DIAGNOSIS — R51.9 NONINTRACTABLE HEADACHE, UNSPECIFIED CHRONICITY PATTERN, UNSPECIFIED HEADACHE TYPE: Primary | ICD-10-CM

## 2023-11-27 LAB — HCG SERPL QL: NEGATIVE

## 2023-11-27 PROCEDURE — 96374 THER/PROPH/DIAG INJ IV PUSH: CPT

## 2023-11-27 PROCEDURE — 96375 TX/PRO/DX INJ NEW DRUG ADDON: CPT

## 2023-11-27 PROCEDURE — 84703 CHORIONIC GONADOTROPIN ASSAY: CPT

## 2023-11-27 PROCEDURE — 96361 HYDRATE IV INFUSION ADD-ON: CPT

## 2023-11-27 PROCEDURE — 73080 X-RAY EXAM OF ELBOW: CPT

## 2023-11-27 PROCEDURE — 2580000003 HC RX 258

## 2023-11-27 PROCEDURE — 99284 EMERGENCY DEPT VISIT MOD MDM: CPT

## 2023-11-27 PROCEDURE — 6360000002 HC RX W HCPCS

## 2023-11-27 PROCEDURE — 6370000000 HC RX 637 (ALT 250 FOR IP)

## 2023-11-27 RX ORDER — ACETAMINOPHEN 500 MG
1000 TABLET ORAL ONCE
Status: COMPLETED | OUTPATIENT
Start: 2023-11-27 | End: 2023-11-27

## 2023-11-27 RX ORDER — PROCHLORPERAZINE EDISYLATE 5 MG/ML
10 INJECTION INTRAMUSCULAR; INTRAVENOUS ONCE
Status: COMPLETED | OUTPATIENT
Start: 2023-11-27 | End: 2023-11-27

## 2023-11-27 RX ORDER — 0.9 % SODIUM CHLORIDE 0.9 %
1000 INTRAVENOUS SOLUTION INTRAVENOUS ONCE
Status: COMPLETED | OUTPATIENT
Start: 2023-11-27 | End: 2023-11-27

## 2023-11-27 RX ORDER — DIPHENHYDRAMINE HYDROCHLORIDE 50 MG/ML
25 INJECTION INTRAMUSCULAR; INTRAVENOUS ONCE
Status: COMPLETED | OUTPATIENT
Start: 2023-11-27 | End: 2023-11-27

## 2023-11-27 RX ADMIN — SODIUM CHLORIDE 1000 ML: 9 INJECTION, SOLUTION INTRAVENOUS at 20:35

## 2023-11-27 RX ADMIN — PROCHLORPERAZINE EDISYLATE 10 MG: 5 INJECTION INTRAMUSCULAR; INTRAVENOUS at 20:34

## 2023-11-27 RX ADMIN — ACETAMINOPHEN 1000 MG: 500 TABLET ORAL at 20:35

## 2023-11-27 RX ADMIN — DIPHENHYDRAMINE HYDROCHLORIDE 25 MG: 50 INJECTION INTRAMUSCULAR; INTRAVENOUS at 20:35

## 2023-11-27 ASSESSMENT — PAIN SCALES - GENERAL: PAINLEVEL_OUTOF10: 8

## 2023-11-28 NOTE — ED NOTES
Pt to ED via triage with complaints of migraine beginning at 1 AM. Pt states she has had headaches before but never this bad. Pt had elevated BP upon arrival to ED, pt had anti-hypertensive's before she was pregnant but was told to stop taking them after that. Pt is on monitor with call light.       Wayne Nielson RN  11/27/23 1585

## 2023-11-28 NOTE — DISCHARGE INSTRUCTIONS
You are seen in the emergency department for your migraines. Your symptoms improved with a migraine cocktail. Your x-ray of your elbow was also negative. You are prescribed Excedrin Migraine please take as prescribed.     PLEASE RETURN TO THE EMERGENCY DEPARTMENT IMMEDIATELY for worsening symptoms, change in vision / hearing / taste, ringing in your ears, loss of sensation or difficulty moving your arms or legs, or if you develop any concerning symptoms such as: high fever not relieved by acetaminophen (Tylenol) and/or ibuprofen (Motrin / Advil), chills, shortness of breath, chest pain, feeling of your heart fluttering or racing, persistent nausea and/or vomiting, vomiting up blood, blood in your stool, numbness, loss of consciousness, weakness or tingling in the arms or legs or change in color of the extremities, changes in mental status, persistent headache, blurry vision, loss of bladder / bowel control, unable to follow up with your physician, or other any other care or concern

## 2023-11-28 NOTE — ED NOTES
Sw asked by RN to assist with transport back to address on file. SW used [facility].  Trip # [id number]     [facility]  11/27/23 [address] [facility] [place]  11/27/23 8200

## 2024-07-19 ENCOUNTER — HOSPITAL ENCOUNTER (EMERGENCY)
Age: 39
Discharge: HOME | End: 2024-07-19
Attending: EMERGENCY MEDICINE
Payer: MEDICAID

## 2024-07-19 ENCOUNTER — APPOINTMENT (OUTPATIENT)
Dept: GENERAL RADIOLOGY | Age: 39
End: 2024-07-19
Payer: MEDICAID

## 2024-07-19 VITALS
DIASTOLIC BLOOD PRESSURE: 96 MMHG | BODY MASS INDEX: 47.09 KG/M2 | HEIGHT: 66 IN | TEMPERATURE: 97.4 F | WEIGHT: 293 LBS | OXYGEN SATURATION: 100 % | RESPIRATION RATE: 20 BRPM | HEART RATE: 76 BPM | SYSTOLIC BLOOD PRESSURE: 162 MMHG

## 2024-07-19 DIAGNOSIS — M25.562 ACUTE PAIN OF LEFT KNEE: Primary | ICD-10-CM

## 2024-07-19 PROCEDURE — 96372 THER/PROPH/DIAG INJ SC/IM: CPT

## 2024-07-19 PROCEDURE — 99284 EMERGENCY DEPT VISIT MOD MDM: CPT

## 2024-07-19 PROCEDURE — 6360000002 HC RX W HCPCS

## 2024-07-19 PROCEDURE — 73562 X-RAY EXAM OF KNEE 3: CPT

## 2024-07-19 RX ORDER — KETOROLAC TROMETHAMINE 15 MG/ML
15 INJECTION, SOLUTION INTRAMUSCULAR; INTRAVENOUS ONCE
Status: DISCONTINUED | OUTPATIENT
Start: 2024-07-19 | End: 2024-07-19

## 2024-07-19 RX ORDER — KETOROLAC TROMETHAMINE 15 MG/ML
15 INJECTION, SOLUTION INTRAMUSCULAR; INTRAVENOUS ONCE
Status: COMPLETED | OUTPATIENT
Start: 2024-07-19 | End: 2024-07-19

## 2024-07-19 RX ADMIN — KETOROLAC TROMETHAMINE 15 MG: 15 INJECTION, SOLUTION INTRAMUSCULAR; INTRAVENOUS at 21:05

## 2024-07-19 ASSESSMENT — PAIN - FUNCTIONAL ASSESSMENT: PAIN_FUNCTIONAL_ASSESSMENT: 0-10

## 2024-07-19 ASSESSMENT — ENCOUNTER SYMPTOMS: SHORTNESS OF BREATH: 1

## 2024-07-19 ASSESSMENT — PAIN SCALES - GENERAL
PAINLEVEL_OUTOF10: 9
PAINLEVEL_OUTOF10: 9

## 2024-07-20 NOTE — ED PROVIDER NOTES
[facility] ED  Emergency Department Encounter  Emergency Medicine Resident     Pt Name:Bisi Hudson  MRN: [id number]  Birthdate 1985  Date of evaluation: 24  PCP:  Yolanda Jacobs APRN - CNP  Note Started: 8:23 PM EDT      CHIEF COMPLAINT       Chief Complaint   Patient presents with    Leg Pain    Leg Swelling       HISTORY OF PRESENT ILLNESS  (Location/Symptom, Timing/Onset, Context/Setting, Quality, Duration, Modifying Factors, Severity.)      Bisi Hudson is a 39 y.o. female who presents with left knee pain and swelling for 2 days. Pain is mostly around the knee area and radiates down the leg to feet and up to mid thigh. Pain is 9/10. States she was going up the stairs when pain came on. Has a history of arthritis and both knees usually hurt and swell, however, the left knee has been more swollen than usual and has not gone down. Usually takes Motrin for pain and swelling has not helped last few days, has not taken any today. Also describes a tingling sensation down leg if she lays for long periods. Is able to bear weight on the left leg and has full range of motion. Denies falling or trauma to the area. Denies joint being warm/hot to touch, fever, chest pain, SOB, history of blood clots, OCP or estrogen use, recent travel.     PAST MEDICAL / SURGICAL / SOCIAL / FAMILY HISTORY      has a past medical history of Chronic hypertension (meds), History of  x2, History of pre-eclampsia x2, Hypertension, and Sleep apnea.       has a past surgical history that includes  section and  section (N/A, 2022).      Social History     Socioeconomic History    Marital status: Single     Spouse name: Not on file    Number of children: Not on file    Years of education: Not on file    Highest education level: Not on file   Occupational History    Not on file   Tobacco Use    Smoking status: Every Day     Current packs/day: 0.25     Average packs/day: 0.3 packs/day

## 2024-07-20 NOTE — ED PROVIDER NOTES
[facility] ED     Emergency Department     Faculty Attestation    I performed a history and physical examination of the patient and discussed management with the resident. I reviewed the resident’s note and agree with the documented findings and plan of care. Any areas of disagreement are noted on the chart. I was personally present for the key portions of any procedures. I have documented in the chart those procedures where I was not present during the key portions. I have reviewed the emergency nurses triage note. I agree with the chief complaint, past medical history, past surgical history, allergies, medications, social and family history as documented unless otherwise noted below. For Physician Assistant/ Nurse Practitioner cases/documentation I have personally evaluated this patient and have completed at least one if not all key elements of the E/M (history, physical exam, and MDM). Additional findings are as noted.    8:39 PM EDT    Patient presents with left knee pain that has been worsening over the past couple of days.  She denies any fall onto the knee or any specific injury.  She says she has had previous pain to that knee.  She denies any fever, chills, chest pain, shortness of breath, nausea or vomiting.  On exam, patient is lying in the bed and appears mildly uncomfortable.  There is tenderness to the left anterior knee.  There is mild edema without erythema or warmth.  There is no calf tenderness.  Will get x-ray of the knee and treat patient's pain.      Lindsay Su MD  Attending Emergency  Physician

## 2024-07-20 NOTE — ED TRIAGE NOTES
Pt reports to ed with complaint of left leg pain and swelling and right knee pain and swelling. Pt reports she's been seen multiple times for same issue with no improvement.

## 2025-02-27 ENCOUNTER — APPOINTMENT (OUTPATIENT)
Dept: CT IMAGING | Age: 40
End: 2025-02-27
Payer: MEDICAID

## 2025-02-27 ENCOUNTER — HOSPITAL ENCOUNTER (EMERGENCY)
Age: 40
Discharge: HOME | End: 2025-02-27
Attending: STUDENT IN AN ORGANIZED HEALTH CARE EDUCATION/TRAINING PROGRAM
Payer: MEDICAID

## 2025-02-27 VITALS
DIASTOLIC BLOOD PRESSURE: 102 MMHG | RESPIRATION RATE: 18 BRPM | SYSTOLIC BLOOD PRESSURE: 146 MMHG | TEMPERATURE: 98.4 F | BODY MASS INDEX: 49.32 KG/M2 | HEART RATE: 90 BPM | OXYGEN SATURATION: 99 % | WEIGHT: 293 LBS

## 2025-02-27 DIAGNOSIS — L03.213 PERIORBITAL CELLULITIS OF RIGHT EYE: Primary | ICD-10-CM

## 2025-02-27 PROCEDURE — 99284 EMERGENCY DEPT VISIT MOD MDM: CPT

## 2025-02-27 PROCEDURE — 6370000000 HC RX 637 (ALT 250 FOR IP)

## 2025-02-27 PROCEDURE — 70450 CT HEAD/BRAIN W/O DYE: CPT

## 2025-02-27 RX ORDER — SULFAMETHOXAZOLE AND TRIMETHOPRIM 800; 160 MG/1; MG/1
1 TABLET ORAL 2 TIMES DAILY
Qty: 14 TABLET | Refills: 0 | Status: SHIPPED | OUTPATIENT
Start: 2025-02-27 | End: 2025-03-06

## 2025-02-27 RX ORDER — SULFAMETHOXAZOLE AND TRIMETHOPRIM 800; 160 MG/1; MG/1
1 TABLET ORAL ONCE
Status: COMPLETED | OUTPATIENT
Start: 2025-02-27 | End: 2025-02-27

## 2025-02-27 RX ORDER — ACETAMINOPHEN 500 MG
1000 TABLET ORAL ONCE
Status: COMPLETED | OUTPATIENT
Start: 2025-02-27 | End: 2025-02-27

## 2025-02-27 RX ORDER — TETRACAINE HYDROCHLORIDE 5 MG/ML
1 SOLUTION OPHTHALMIC ONCE
Status: COMPLETED | OUTPATIENT
Start: 2025-02-27 | End: 2025-02-27

## 2025-02-27 RX ORDER — OXYCODONE HYDROCHLORIDE 5 MG/1
5 TABLET ORAL EVERY 6 HOURS PRN
Qty: 8 TABLET | Refills: 0 | Status: SHIPPED | OUTPATIENT
Start: 2025-02-27 | End: 2025-03-01

## 2025-02-27 RX ORDER — OXYCODONE HYDROCHLORIDE 5 MG/1
5 TABLET ORAL ONCE
Status: COMPLETED | OUTPATIENT
Start: 2025-02-27 | End: 2025-02-27

## 2025-02-27 RX ADMIN — ACETAMINOPHEN 1000 MG: 500 TABLET ORAL at 09:35

## 2025-02-27 RX ADMIN — FLUORESCEIN SODIUM 2 MG: 1 STRIP OPHTHALMIC at 09:36

## 2025-02-27 RX ADMIN — TETRACAINE HYDROCHLORIDE 1 DROP: 5 SOLUTION OPHTHALMIC at 09:40

## 2025-02-27 RX ADMIN — OXYCODONE 5 MG: 5 TABLET ORAL at 11:56

## 2025-02-27 RX ADMIN — SULFAMETHOXAZOLE AND TRIMETHOPRIM 1 TABLET: 800; 160 TABLET ORAL at 11:56

## 2025-02-27 RX ADMIN — AMOXICILLIN AND CLAVULANATE POTASSIUM 1 TABLET: 875; 125 TABLET, FILM COATED ORAL at 11:56

## 2025-02-27 ASSESSMENT — PAIN DESCRIPTION - DESCRIPTORS
DESCRIPTORS: ACHING
DESCRIPTORS: ACHING

## 2025-02-27 ASSESSMENT — VISUAL ACUITY
OU: 20/30
OD: 20/50
OS: 20/30

## 2025-02-27 ASSESSMENT — PAIN DESCRIPTION - ORIENTATION
ORIENTATION: RIGHT
ORIENTATION: RIGHT;LEFT

## 2025-02-27 ASSESSMENT — PAIN DESCRIPTION - PAIN TYPE: TYPE: ACUTE PAIN

## 2025-02-27 ASSESSMENT — PAIN - FUNCTIONAL ASSESSMENT: PAIN_FUNCTIONAL_ASSESSMENT: 0-10

## 2025-02-27 ASSESSMENT — PAIN SCALES - GENERAL
PAINLEVEL_OUTOF10: 6
PAINLEVEL_OUTOF10: 9

## 2025-02-27 ASSESSMENT — PAIN DESCRIPTION - LOCATION
LOCATION: EYE
LOCATION: EYE

## 2025-02-27 ASSESSMENT — PAIN DESCRIPTION - FREQUENCY: FREQUENCY: INTERMITTENT

## 2025-02-27 NOTE — ED NOTES
Pt presents to the ED with c/o of bilateral eye pain.   Pt states she usually gets a stye on her R eye, states she usually scratches it off her eye, states it got worse after scratching it.   Pt states she has been rubbing her left eye, which is red.   Pt denies scratching eye, states she was checked for conjunctivitis, states R eye is draining clear fluid.   Pt states she did not take her bp medications PTA but did take one 200mg motrin PTA.   Pt states pain is 6/10, visual acuity obtained, denies other complaints.

## 2025-02-27 NOTE — DISCHARGE INSTRUCTIONS
You were seen in the ER today for right eye pain.  You have cellulitis, which is an infection of the skin surrounding your eye.    You are being given a course of antibiotics, please take all of them even if you begin to feel better.  You should not have any antibiotics leftover. If you do not take all of them there is risk of antibiotic resistance and difficulty treating infections in the future.     Take Tylenol, Motrin for pain.  You are being given a few pills of stronger pain medication for breakthrough, do not drive or operate heavy machinery when taking these medications.    The drops can make your eye numb, avoid using too frequently, you risk scratching her eye and not realizing.    Call the ophthalmologist above to schedule outpatient follow-up.  Also recommend calling your doctor to get a primary care visit within the next few days if possible.    Return if new or worsening symptoms or any other concerns.

## 2025-02-27 NOTE — ED PROVIDER NOTES
[facility]     Emergency Department     Faculty Attestation    I performed a history and physical examination of the patient and discussed management with the resident. I have reviewed and agree with the resident’s findings including all diagnostic interpretations, and treatment plans as written at the time of my review. Any areas of disagreement are noted on the chart. I was personally present for the key portions of any procedures. I have documented in the chart those procedures where I was not present during the key portions. For Physician Assistant/ Nurse Practitioner cases/documentation I have personally evaluated this patient and have completed at least one if not all key elements of the E/M (history, physical exam, and MDM). Additional findings are as noted.    PtName: Bisi Hudson  MRN: [id number]  Birthdate 1985  Date of evaluation: 2/27/25  Note Started: 8:55 AM EST    Primary Care Physician: Yolanda Jacobs APRN - CNP    Brief HPI:  Patient is a 40-year-old female presents emergency department with right eye redness, swelling, pain.  She reports that she had a pustule on her upper eyelid 2 days ago.  She reports that since that time she has had worsening swelling and pain of the right eye which prompted presentation to the emergency department.  She also noticed redness in the left eye today    Pertinent Physical Exam Findings:  Vitals:    02/27/25 0859   BP:    Pulse:    Resp:    Temp: 98.4 °F (36.9 °C)   SpO2:    Periorbital swelling, erythema, dark skin changes noted to the right eye.  Extraocular motions are intact.  Pain with right extraocular motions. bilateral conjunctival and injection, right greater than left.  see resident note for fluorescein stain.     Medical Decision Making: Patient is a 40 y.o. female presenting to the emergency department with right eye swelling and pain. The chart was reviewed for pertinent history relating to the 
tablets by mouth every 6 hours as needed for Pain 6/19/22   Sam Bran DO   calcium carbonate (OS-BAR) 1250 (500 Ca) MG chewable tablet Take 200 mg by mouth daily 3/23/22   Marquez John MD   cetirizine (ZYRTEC) 10 MG tablet Take 10 mg by mouth nightly 3/23/22   Marquez John MD   diphenhydrAMINE (BENADRYL) 25 MG capsule Take 25 mg by mouth every 6 hours as needed    Marquez John MD   famotidine (PEPCID) 20 MG tablet Take 20 mg by mouth 2 times daily 3/24/22   Marquez John MD   pyridoxine (B-6) 50 MG tablet Take 25 mg by mouth daily 3/23/22   Marquez John MD   lidocaine (LIDODERM) 5 % Place 1 patch onto the skin daily 12 hours on, 12 hours off. 4/17/21   Surjit Muhammad MD     REVIEW OF SYSTEMS       Review of Systems  All other systems negative unless otherwise stated above    PHYSICAL EXAM      INITIAL VITALS:   BP (!) 146/102   Pulse 90   Temp 98.4 °F (36.9 °C)   Resp 18   Wt (!) 138.6 kg (305 lb 8.9 oz)   LMP 02/06/2025 (Approximate)   SpO2 99%   BMI 49.32 kg/m²     Physical Exam  Vitals reviewed.   Constitutional:       General: She is not in acute distress.     Appearance: Normal appearance.   HENT:      Nose: Nose normal.      Mouth/Throat:      Mouth: Mucous membranes are moist.      Pharynx: Oropharynx is clear.   Eyes:      Comments: Right eye: Injected conjunctiva, very minimal chemosis.  Pain with looking to the right.  Mild periorbital edema and darkening skin changes.  Clear drainage from right eye.  Pupils equal and reactive.  Left conjunctiva with minimal injection.   Cardiovascular:      Rate and Rhythm: Normal rate and regular rhythm.   Pulmonary:      Effort: Pulmonary effort is normal. No respiratory distress.   Skin:     General: Skin is warm and dry.   Neurological:      General: No focal deficit present.      Mental Status: She is alert and oriented to person, place, and time.       DDX/DIAGNOSTIC RESULTS / EMERGENCY DEPARTMENT COURSE /

## 2025-06-26 ENCOUNTER — HOSPITAL ENCOUNTER (EMERGENCY)
Age: 40
Discharge: HOME | End: 2025-06-27
Attending: EMERGENCY MEDICINE
Payer: MEDICAID

## 2025-06-26 ENCOUNTER — APPOINTMENT (OUTPATIENT)
Dept: ULTRASOUND IMAGING | Age: 40
End: 2025-06-26
Payer: MEDICAID

## 2025-06-26 ENCOUNTER — APPOINTMENT (OUTPATIENT)
Dept: GENERAL RADIOLOGY | Age: 40
End: 2025-06-26
Payer: MEDICAID

## 2025-06-26 DIAGNOSIS — O46.90 VAGINAL BLEEDING IN PREGNANCY: ICD-10-CM

## 2025-06-26 DIAGNOSIS — O36.80X0 PREGNANCY OF UNKNOWN ANATOMIC LOCATION: Primary | ICD-10-CM

## 2025-06-26 LAB
B-HCG SERPL EIA 3RD IS-ACNC: 1513 MIU/ML
BACTERIA URNS QL MICRO: ABNORMAL
BASOPHILS # BLD: 0.03 K/UL (ref 0–0.2)
BASOPHILS NFR BLD: 0 % (ref 0–2)
BILIRUB UR QL STRIP: NEGATIVE
CANDIDA SPECIES: NEGATIVE
CASTS #/AREA URNS LPF: ABNORMAL /LPF (ref 0–8)
CLARITY UR: CLEAR
COLOR UR: YELLOW
EOSINOPHIL # BLD: 0.17 K/UL (ref 0–0.44)
EOSINOPHILS RELATIVE PERCENT: 2 % (ref 1–4)
EPI CELLS #/AREA URNS HPF: ABNORMAL /HPF (ref 0–5)
ERYTHROCYTE [DISTWIDTH] IN BLOOD BY AUTOMATED COUNT: 15.2 % (ref 11.8–14.4)
GARDNERELLA VAGINALIS: POSITIVE
GLUCOSE UR STRIP-MCNC: NEGATIVE MG/DL
HCG SERPL QL: POSITIVE
HCT VFR BLD AUTO: 31.7 % (ref 36.3–47.1)
HGB BLD-MCNC: 10.6 G/DL (ref 11.9–15.1)
HGB UR QL STRIP.AUTO: ABNORMAL
IMM GRANULOCYTES # BLD AUTO: <0.03 K/UL (ref 0–0.3)
IMM GRANULOCYTES NFR BLD: 0 %
KETONES UR STRIP-MCNC: ABNORMAL MG/DL
LEUKOCYTE ESTERASE UR QL STRIP: ABNORMAL
LYMPHOCYTES NFR BLD: 2.06 K/UL (ref 1.1–3.7)
LYMPHOCYTES RELATIVE PERCENT: 23 % (ref 24–43)
MCH RBC QN AUTO: 31.4 PG (ref 25.2–33.5)
MCHC RBC AUTO-ENTMCNC: 33.4 G/DL (ref 28.4–34.8)
MCV RBC AUTO: 93.8 FL (ref 82.6–102.9)
MONOCYTES NFR BLD: 0.69 K/UL (ref 0.1–1.2)
MONOCYTES NFR BLD: 8 % (ref 3–12)
NEUTROPHILS NFR BLD: 67 % (ref 36–65)
NEUTS SEG NFR BLD: 5.85 K/UL (ref 1.5–8.1)
NITRITE UR QL STRIP: NEGATIVE
NRBC BLD-RTO: 0 PER 100 WBC
PH UR STRIP: 5.5 [PH] (ref 5–8)
PLATELET # BLD AUTO: 321 K/UL (ref 138–453)
PMV BLD AUTO: 8.9 FL (ref 8.1–13.5)
PROT UR STRIP-MCNC: ABNORMAL MG/DL
RBC # BLD AUTO: 3.38 M/UL (ref 3.95–5.11)
RBC # BLD: ABNORMAL 10*6/UL
RBC #/AREA URNS HPF: ABNORMAL /HPF (ref 0–4)
SOURCE: ABNORMAL
SP GR UR STRIP: 1.02 (ref 1–1.03)
TRICHOMONAS: NEGATIVE
UROBILINOGEN UR STRIP-ACNC: NORMAL EU/DL (ref 0–1)
WBC #/AREA URNS HPF: ABNORMAL /HPF (ref 0–5)
WBC OTHER # BLD: 8.8 K/UL (ref 3.5–11.3)

## 2025-06-26 PROCEDURE — 99284 EMERGENCY DEPT VISIT MOD MDM: CPT

## 2025-06-26 PROCEDURE — 6370000000 HC RX 637 (ALT 250 FOR IP)

## 2025-06-26 PROCEDURE — 87510 GARDNER VAG DNA DIR PROBE: CPT

## 2025-06-26 PROCEDURE — 87591 N.GONORRHOEAE DNA AMP PROB: CPT

## 2025-06-26 PROCEDURE — 2580000003 HC RX 258

## 2025-06-26 PROCEDURE — 81001 URINALYSIS AUTO W/SCOPE: CPT

## 2025-06-26 PROCEDURE — 87491 CHLMYD TRACH DNA AMP PROBE: CPT

## 2025-06-26 PROCEDURE — 87660 TRICHOMONAS VAGIN DIR PROBE: CPT

## 2025-06-26 PROCEDURE — 84702 CHORIONIC GONADOTROPIN TEST: CPT

## 2025-06-26 PROCEDURE — 76817 TRANSVAGINAL US OBSTETRIC: CPT

## 2025-06-26 PROCEDURE — 87480 CANDIDA DNA DIR PROBE: CPT

## 2025-06-26 PROCEDURE — 76801 OB US < 14 WKS SINGLE FETUS: CPT

## 2025-06-26 PROCEDURE — 84703 CHORIONIC GONADOTROPIN ASSAY: CPT

## 2025-06-26 PROCEDURE — 73562 X-RAY EXAM OF KNEE 3: CPT

## 2025-06-26 PROCEDURE — 85025 COMPLETE CBC W/AUTO DIFF WBC: CPT

## 2025-06-26 RX ORDER — METRONIDAZOLE 500 MG/1
500 TABLET ORAL ONCE
Status: COMPLETED | OUTPATIENT
Start: 2025-06-27 | End: 2025-06-27

## 2025-06-26 RX ORDER — 0.9 % SODIUM CHLORIDE 0.9 %
1000 INTRAVENOUS SOLUTION INTRAVENOUS ONCE
Status: COMPLETED | OUTPATIENT
Start: 2025-06-26 | End: 2025-06-26

## 2025-06-26 RX ORDER — ACETAMINOPHEN 500 MG
1000 TABLET ORAL ONCE
Status: COMPLETED | OUTPATIENT
Start: 2025-06-26 | End: 2025-06-26

## 2025-06-26 RX ADMIN — SODIUM CHLORIDE 1000 ML: 0.9 INJECTION, SOLUTION INTRAVENOUS at 20:31

## 2025-06-26 RX ADMIN — ACETAMINOPHEN 1000 MG: 500 TABLET ORAL at 20:29

## 2025-06-26 ASSESSMENT — PAIN DESCRIPTION - ORIENTATION: ORIENTATION: UPPER

## 2025-06-26 ASSESSMENT — PAIN - FUNCTIONAL ASSESSMENT
PAIN_FUNCTIONAL_ASSESSMENT: 0-10
PAIN_FUNCTIONAL_ASSESSMENT: ACTIVITIES ARE NOT PREVENTED

## 2025-06-26 ASSESSMENT — PAIN SCALES - GENERAL
PAINLEVEL_OUTOF10: 8
PAINLEVEL_OUTOF10: 10

## 2025-06-26 ASSESSMENT — PAIN DESCRIPTION - LOCATION: LOCATION: ABDOMEN

## 2025-06-26 NOTE — ED NOTES
Pt to ED via triage a/o x4 with c/o vaginal bleeding.   Pt reports she took 2 positive pregnancy tests at the end of April.   Pt reports she started having some vaginal bleedig today.   Pt also reports intermittent pelvic pain and back pain. Pt denies any meds pta.   Pt is HTN on arrival and reports hx of HTN.   Pt call light is in reach, NAD noted

## 2025-06-26 NOTE — ED PROVIDER NOTES
[facility]  Emergency Department  Faculty Attestation     I performed a history and physical examination of the patient and discussed management with the resident. I reviewed the resident’s note and agree with the documented findings and plan of care. Any areas of disagreement are noted on the chart. I was personally present for the key portions of any procedures. I have documented in the chart those procedures where I was not present during the key portions. I have reviewed the emergency nurses triage note. I agree with the chief complaint, past medical history, past surgical history, allergies, medications, social and family history as documented unless otherwise noted below.    For Physician Assistant/ Nurse Practitioner cases/documentation I have personally evaluated this patient and have completed at least one if not all key elements of the E/M (history, physical exam, and MDM). Additional findings are as noted.    Preliminary note started at 7:08 PM EDT    Primary Care Physician:  Yolanda Jacobs APRN - CNP    Screenings:  [unfilled]    CHIEF COMPLAINT       Chief Complaint   Patient presents with    Vaginal Bleeding       RECENT VITALS:   BP (!) 144/92   Pulse 87   Temp 98.6 °F (37 °C)   Resp 18   Ht 1.676 m (5' 6\")   Wt 127 kg (280 lb)   SpO2 98%   BMI 45.19 kg/m²     LABS:  Labs Reviewed   CBC WITH AUTO DIFFERENTIAL   HCG, SERUM, QUALITATIVE       Radiology  No orders to display           Attending Physician Additional  Notes    Patient is having vaginal bleeding onset last night, minimal cramps until now.  No passage of tissue.  Last menstrual period was 2 to 3 months ago.  She suspects she is pregnant but this was not tested.  No recent ultrasound.  Rh is positive.  She also has hidradenitis suppurativa, not presently treated, prior I&D's, active drainage from lesions in the right buttock region.  On exam she is slightly hypertensive, uncomfortable, afebrile,

## 2025-06-26 NOTE — ED PROVIDER NOTES
[facility] EMERGENCY DEPARTMENT  Emergency Department Encounter  Emergency Medicine Resident     Pt Name:Bisi Hudson  MRN: [id number]  Birthdate 1985  Date of evaluation: 25  PCP:  Yolanda Jacobs APRN - CNP  Note Started: 7:12 PM EDT      CHIEF COMPLAINT       Chief Complaint   Patient presents with    Vaginal Bleeding       HISTORY OF PRESENT ILLNESS  (Location/Symptom, Timing/Onset, Context/Setting, Quality, Duration, Modifying Factors, Severity.)      Bisi Hudson is a 40 y.o. female who presents with LMP in April, 2 positive pregnancy tests.  G4, P3, history of preeclampsia, does not take medications.  Has not seen OB.  1 full-term pregnancy, 2 premature.  Patient is here for concerns of vaginal bleeding.  Patient reports she is not sure if the blood is coming from her vagina or one of her boils.  Patient reports that since she was 13 she has frequent boils around her breasts, back, inguinal area, axilla.  Has seen general surgery but never dermatology.  Unsure whether he has been diagnosed with hidradenitis suppurativa.  Patient denies abdominal pain, dysuria, shortness of breath, chest pain.  Patient also reporting knee pain for the last couple of days.  Denies trauma.     PAST MEDICAL / SURGICAL / SOCIAL / FAMILY HISTORY      has a past medical history of Chronic hypertension (meds), History of  x2, History of pre-eclampsia x2, Hypertension, and Sleep apnea.     has a past surgical history that includes  section and  section (N/A, 2022).    Social History     Socioeconomic History    Marital status: Single     Spouse name: Not on file    Number of children: Not on file    Years of education: Not on file    Highest education level: Not on file   Occupational History    Not on file   Tobacco Use    Smoking status: Every Day     Current packs/day: 0.25     Average packs/day: 0.3 packs/day for 22.0 years (5.5 ttl pk-yrs)     Types: Cigarettes     chills or any other concern.  Patient expresses understanding and agreement with plan.  Will be discharged with Flagyl for BV.      Amount and/or Complexity of Data Reviewed  Labs: ordered. Decision-making details documented in ED Course.  Radiology: ordered. Decision-making details documented in ED Course.    Risk  OTC drugs.  Prescription drug management.        EMERGENCY DEPARTMENT COURSE:    ED Course as of 06/27/25 0847   Thu Jun 26, 2025 1934 This is a 40-year-old female LMP in April, 2 positive pregnancy tests.  G4, P3, history of preeclampsia, does not take medications.  Has not seen OB.  1 full-term pregnancy, 2 premature.  Patient is here for concerns of vaginal bleeding.  Patient reports she is not sure if the blood is coming from her vagina or one of her boils.  Patient reports that since she was 13 she has frequent boils around her breasts, back, inguinal area, axilla.  Has seen general surgery but never dermatology.  Unsure whether he has been diagnosed with hidradenitis suppurativa.  Patient denies abdominal pain, dysuria, shortness of breath, chest pain.  Patient also reporting knee pain for the last couple of days.  Denies trauma.  On physical exam patient is well-appearing, no acute distress, abdomen soft, nontender, vaginal exam with blood in the vaginal canal, os is closed.  Patient does have abscesses over the bilateral thighs, inguinal area, back, similar draining.  Will give patient dermatology follow-up on discharge, obtain knee x-ray, hCG, pelvic swabs, UA, CBC.    DDx: Miscarriage, threatened miscarriage, ectopic pregnancy, IUP, arthritis of the knee, stress fracture, hidradenitis suppurativa [AS]   1948 HCG Qualitative, Serum(!):    Preg, Serum POSITIVE(!) [AS]   2113 HCG, Quantitative, Pregnancy:    HCG, Beta 1,513.0 [AS]   2225 US OB TRANSVAGINAL  IMPRESSION:  No intrauterine or extrauterine gestational sac identified.  Findings may be  related to too earlier pregnancy or missed

## 2025-06-27 VITALS
TEMPERATURE: 98.6 F | HEART RATE: 87 BPM | WEIGHT: 280 LBS | SYSTOLIC BLOOD PRESSURE: 153 MMHG | BODY MASS INDEX: 45 KG/M2 | OXYGEN SATURATION: 98 % | RESPIRATION RATE: 18 BRPM | HEIGHT: 66 IN | DIASTOLIC BLOOD PRESSURE: 80 MMHG

## 2025-06-27 LAB
C TRACH DNA SPEC QL PROBE+SIG AMP: NEGATIVE
N GONORRHOEA DNA SPEC QL PROBE+SIG AMP: NEGATIVE
SPECIMEN DESCRIPTION: NORMAL

## 2025-06-27 PROCEDURE — 6370000000 HC RX 637 (ALT 250 FOR IP)

## 2025-06-27 RX ORDER — METRONIDAZOLE 500 MG/1
500 TABLET ORAL 2 TIMES DAILY
Qty: 14 TABLET | Refills: 0 | Status: SHIPPED | OUTPATIENT
Start: 2025-06-27 | End: 2025-07-04

## 2025-06-27 RX ADMIN — METRONIDAZOLE 500 MG: 500 TABLET ORAL at 00:00

## 2025-06-27 NOTE — DISCHARGE INSTRUCTIONS
You were seen for concerns of vaginal bleeding in pregnancy.  We did not see a confirmed pregnancy within the uterus.  Is important you follow-up with the repeat labs that OB/GYN ordered and follow-up with them.    Take your medication as indicated, you are prescribed Flagyl which is an antibiotic, make sure you get the prescription filled and take the antibiotics until finished.  Drink plenty of water while taking the antibiotics.  Do not drink alcohol while taking the medication metronidazole (Flagyl).  Avoid drinking alcohol or drinks that have caffeine in it while taking antibiotics.       For pain use acetaminophen (Tylenol), unless prescribed medications that have acetaminophen in it.  You can take over the counter acetaminophen tablets (1 - 2 tablets of the 500-mg strength every 6 hours).    PLEASE RETURN TO THE EMERGENCY DEPARTMENT IMMEDIATELY for worsening symptoms, develop vaginal bleeding or discharge, inability to urinate, or if you develop any concerning symptoms such as: high fever not relieved by acetaminophen (Tylenol) and/or ibuprofen (Motrin / Advil), chills, shortness of breath, chest pain, feeling of your heart fluttering or racing, persistent nausea and/or vomiting, numbness, weakness or tingling in the arms or legs or change in color of the extremities, changes in mental status, persistent headache, blurry vision, unable to follow up with your physician, or other any other care or concern.

## 2025-06-27 NOTE — CONSULTS
OB/GYN Consult  [facility]    Patient Name: Bisi Hudson     Patient : 1985  Room/Bed:   Admission Date/Time: 2025  6:28 PM  Primary Care Physician: Yolanda Jacobs APRN - CNP    Consulting Provider: Dr. Rosa  Reason for Consult: Vaginal Spotting, + Pregnancy    CC:   Chief Complaint   Patient presents with    Vaginal Bleeding                HPI: Bisi Hudson is a 40 y.o. female  presents with complaint of vaginal spotting intermittently since yesterday. She reports a positive home pregnancy test 1 week ago but denies current abdominal pain, nausea/vomiting, fever/chills. She is unsure of exactly when her last LMP is, but she states this is not a planned pregnancy, however she was not preventing pregnancy. Upon evaluation patient reports she barely notices any more bleeding and she overall feels well after receiving fluids and Tylenol.    TVUS: No intrauterine or extrauterine gestational sac identified. The right ovary measures 3.0 x 2.4 x 1.9 cm in dimension. Color flow is present. Probably corpus luteum measuring 1.8 x 2.0 x 1.7 cm. The left ovary is not visualized.    REVIEW OF SYSTEMS:   A minimum of an eleven point review of systems was completed.    Constitutional: negative fever, negative chills  HEENT: negative visual disturbances, negative headaches  Respiratory: negative dyspnea, negative cough  Cardiovascular: negative chest pain,  negative palpitations  Gastrointestinal: negative abdominal pain, negative RUQ pain, negative N/V, negative diarrhea, negative constipation  Genitourinary: negative dysuria, negative vaginal discharge, + vaginal spotting  Dermatological: negative rash, negative wounds  Hematologic: negative bleeding/clotting disorder  Immunologic: negative recent illness, negative recent sick contact, negative allergic reactions  Lymphatic: negative lymph nodes  Musculoskeletal: negative back pain, negative myalgias, negative  Leukocyte Esterase, Urine TRACE (A) NEGATIVE   HCG, Quantitative, Pregnancy   Result Value Ref Range    hCG Quant 1,513.0 mIU/mL   Microscopic Urinalysis   Result Value Ref Range    WBC, UA 5 TO 10 0 - 5 /HPF    RBC, UA 50  0 - 4 /HPF    Casts UA  0 - 8 /LPF     5 TO 10 HYALINE Reference range defined for non-centrifuged specimen.    Epithelial Cells, UA 10 TO 20 0 - 5 /HPF    Bacteria, UA FEW (A) None       DIAGNOSTICS:  US OB TRANSVAGINAL  Result Date: 2025  EXAMINATION: FIRST TRIMESTER OBSTETRIC ULTRASOUND 2025 TECHNIQUE: Transabdominal and transvaginal first trimester obstetric pelvic duplex ultrasound was performed with real-time imaging, color flow Doppler imaging, and spectral analysis. COMPARISON: None HISTORY: ORDERING SYSTEM PROVIDED HISTORY: pregnancy, no confirmed IUP TECHNOLOGIST PROVIDED HISTORY: pregnancy, no confirmed IUP FINDINGS: Uterus and the gestational sac: Uterus: Uterus is anteverted measuring 12.0 x 5.1 x 6.3 cm and demonstrates homogeneous echotexture.  No focal lesion/fibroid.  Endometrial stripe measuring 13 mm in thickness, within normal limits.  Unremarkable cervix. No intrauterine or extrauterine gestational sac identified. Ovaries and adnexa: The right ovary measures 3.0 x 2.4 x 1.9 cm in dimension. Color flow is present. Probably corpus luteum measuring 1.8 x 2.0 x 1.7 cm. The left ovary is not visualized. No adnexal mass.  Mildly enlarged left adnexal lymph nodes.  The No abnormal pelvic fluid/ascites.     No intrauterine or extrauterine gestational sac identified.  Findings may be related to too earlier pregnancy or missed .  Correlate clinically and follow-up no earlier than 14 days is recommended.     US OB LESS THAN 14 WEEKS SINGLE OR FIRST GESTATION  Result Date: 2025  EXAMINATION: FIRST TRIMESTER OBSTETRIC ULTRASOUND 2025 TECHNIQUE: Transabdominal and transvaginal first trimester obstetric pelvic duplex ultrasound was performed with real-time

## 2025-06-27 NOTE — PROGRESS NOTES
Attempted to call but get message that states the person you are trying to call is not accepting calls at this time

## (undated) DEVICE — KENDALL SCD EXPRESS SLEEVES, KNEE LENGTH, MEDIUM: Brand: KENDALL SCD

## (undated) DEVICE — Z DUP USE 2522782 SOLUTION IRRIG 1000ML STRL H2O PLAS CONTAINER UROMATIC

## (undated) DEVICE — TOWEL SURG W16XL26IN WHT NONFENESTRATED ST 2 PER PK

## (undated) DEVICE — SOLUTION SOD CHL 0.9% 1000ML

## (undated) DEVICE — SUTURE CHROMIC GUT 0 L36IN CT-1 L36MM 1/2 CIR TAPERPOINT 924H

## (undated) DEVICE — SUTURE VCRL SZ 2-0 L36IN ABSRB VLT L36MM CT-1 1/2 CIR J345H